# Patient Record
Sex: FEMALE | Race: WHITE | Employment: FULL TIME | URBAN - METROPOLITAN AREA
[De-identification: names, ages, dates, MRNs, and addresses within clinical notes are randomized per-mention and may not be internally consistent; named-entity substitution may affect disease eponyms.]

---

## 2017-05-25 ENCOUNTER — OFFICE VISIT (OUTPATIENT)
Dept: FAMILY MEDICINE CLINIC | Age: 57
End: 2017-05-25

## 2017-05-25 VITALS
BODY MASS INDEX: 33.82 KG/M2 | TEMPERATURE: 98.1 F | HEART RATE: 82 BPM | SYSTOLIC BLOOD PRESSURE: 134 MMHG | OXYGEN SATURATION: 98 % | WEIGHT: 203 LBS | RESPIRATION RATE: 18 BRPM | HEIGHT: 65 IN | DIASTOLIC BLOOD PRESSURE: 76 MMHG

## 2017-05-25 DIAGNOSIS — Z11.59 NEED FOR HEPATITIS C SCREENING TEST: ICD-10-CM

## 2017-05-25 DIAGNOSIS — E10.42 DIABETIC PERIPHERAL NEUROPATHY ASSOCIATED WITH TYPE 1 DIABETES MELLITUS (HCC): ICD-10-CM

## 2017-05-25 DIAGNOSIS — J45.30 MILD PERSISTENT ASTHMA WITHOUT COMPLICATION: ICD-10-CM

## 2017-05-25 DIAGNOSIS — E66.9 OBESITY (BMI 30-39.9): ICD-10-CM

## 2017-05-25 DIAGNOSIS — L84 PRE-ULCERATIVE CALLUSES: ICD-10-CM

## 2017-05-25 DIAGNOSIS — F33.41 RECURRENT MAJOR DEPRESSIVE DISORDER, IN PARTIAL REMISSION (HCC): ICD-10-CM

## 2017-05-25 DIAGNOSIS — Z00.00 WELCOME TO MEDICARE PREVENTIVE VISIT: Primary | ICD-10-CM

## 2017-05-25 DIAGNOSIS — I10 ESSENTIAL HYPERTENSION: ICD-10-CM

## 2017-05-25 DIAGNOSIS — E10.40 TYPE 1 DIABETES MELLITUS WITH DIABETIC NEUROPATHY (HCC): ICD-10-CM

## 2017-05-25 DIAGNOSIS — M72.2 PLANTAR FASCIITIS, RIGHT: ICD-10-CM

## 2017-05-25 PROBLEM — E11.9 TYPE 2 DIABETES MELLITUS (HCC): Status: ACTIVE | Noted: 2017-05-25

## 2017-05-25 PROBLEM — J45.909 ASTHMA: Status: ACTIVE | Noted: 2017-05-25

## 2017-05-25 PROBLEM — J45.909 ASTHMA: Status: RESOLVED | Noted: 2017-05-25 | Resolved: 2017-05-25

## 2017-05-25 PROBLEM — E11.9 TYPE 2 DIABETES MELLITUS (HCC): Status: RESOLVED | Noted: 2017-05-25 | Resolved: 2017-05-25

## 2017-05-25 RX ORDER — LOSARTAN POTASSIUM 50 MG/1
50 TABLET ORAL DAILY
Qty: 90 TAB | Refills: 3 | Status: SHIPPED | OUTPATIENT
Start: 2017-05-25 | End: 2017-06-02 | Stop reason: SDUPTHER

## 2017-05-25 RX ORDER — BUPROPION HYDROCHLORIDE 150 MG/1
150 TABLET ORAL
Qty: 90 TAB | Refills: 3 | Status: SHIPPED | OUTPATIENT
Start: 2017-05-25 | End: 2018-09-04 | Stop reason: SDUPTHER

## 2017-05-25 RX ORDER — ALBUTEROL SULFATE 90 UG/1
2 AEROSOL, METERED RESPIRATORY (INHALATION)
Qty: 1 INHALER | Refills: 1 | Status: SHIPPED | OUTPATIENT
Start: 2017-05-25 | End: 2017-11-07 | Stop reason: SDUPTHER

## 2017-05-25 RX ORDER — PRAVASTATIN SODIUM 40 MG/1
40 TABLET ORAL
COMMUNITY
End: 2017-05-25 | Stop reason: SDUPTHER

## 2017-05-25 RX ORDER — DULOXETIN HYDROCHLORIDE 60 MG/1
60 CAPSULE, DELAYED RELEASE ORAL DAILY
COMMUNITY
End: 2017-05-25 | Stop reason: SDUPTHER

## 2017-05-25 RX ORDER — LANOLIN ALCOHOL/MO/W.PET/CERES
1000 CREAM (GRAM) TOPICAL DAILY
COMMUNITY

## 2017-05-25 RX ORDER — BUPROPION HYDROCHLORIDE 150 MG/1
TABLET, EXTENDED RELEASE ORAL DAILY
COMMUNITY
End: 2017-05-25

## 2017-05-25 RX ORDER — PRAVASTATIN SODIUM 40 MG/1
40 TABLET ORAL
Qty: 90 TAB | Refills: 3 | Status: SHIPPED | OUTPATIENT
Start: 2017-05-25 | End: 2017-05-27

## 2017-05-25 RX ORDER — CYANOCOBALAMIN (VITAMIN B-12) 500 MCG
400 TABLET ORAL DAILY
COMMUNITY

## 2017-05-25 RX ORDER — DULOXETIN HYDROCHLORIDE 60 MG/1
60 CAPSULE, DELAYED RELEASE ORAL DAILY
Qty: 90 CAP | Refills: 3 | Status: SHIPPED | OUTPATIENT
Start: 2017-05-25 | End: 2018-07-29 | Stop reason: SDUPTHER

## 2017-05-25 NOTE — MR AVS SNAPSHOT
Visit Information Date & Time Provider Department Dept. Phone Encounter #  
 5/25/2017  2:00 PM Katelyn Garcia, [address] [facility] [phone] [id number] Follow-up Instructions Return in about 6 months (around 11/25/2017). Upcoming Health Maintenance Date Due Hepatitis C Screening 1960 HEMOGLOBIN A1C Q6M 1960 LIPID PANEL Q1 1960 FOOT EXAM Q1 10/28/1970 MICROALBUMIN Q1 10/28/1970 EYE EXAM RETINAL OR DILATED Q1 10/28/1970 Pneumococcal 19-64 Medium Risk (1 of 1 - PPSV23) 10/28/1979 DTaP/Tdap/Td series (1 - Tdap) 10/28/1981 PAP AKA CERVICAL CYTOLOGY 10/28/1981 BREAST CANCER SCRN MAMMOGRAM 10/28/2010 FOBT Q 1 YEAR AGE 50-75 10/28/2010 INFLUENZA AGE 9 TO ADULT 8/1/2017 Allergies as of 5/25/2017  Review Complete On: 5/25/2017 By: Katelyn Garcia MD  
  
 Severity Noted Reaction Type Reactions Lyrica [Pregabalin]  05/25/2017   Intolerance Vertigo Current Immunizations  Never Reviewed No immunizations on file. Not reviewed this visit You Were Diagnosed With   
  
 Codes Comments Welcome to Medicare preventive visit    -  Primary ICD-10-CM: Z00.00 ICD-9-CM: V70.0 Type 1 diabetes mellitus with diabetic neuropathy (HCC)     ICD-10-CM: E10.40 ICD-9-CM: 250.61, 357.2 Essential hypertension     ICD-10-CM: I10 
ICD-9-CM: 401.9 Obesity (BMI 30-39. 9)     ICD-10-CM: E66.9 ICD-9-CM: 278.00 Mild persistent asthma without complication     [facility]: J45.30 ICD-9-CM: 493.90 Plantar fasciitis, right     ICD-10-CM: M72.2 ICD-9-CM: 728.71 Need for hepatitis C screening test     ICD-10-CM: Z11.59 
ICD-9-CM: V73.89 Pre-ulcerative calluses     ICD-10-CM: L84 
ICD-9-CM: 595 Diabetic peripheral neuropathy associated with type 1 diabetes mellitus (HCC)     ICD-10-CM: E10.42 
ICD-9-CM: 250.61, 357.2 Vitals BP Pulse Temp Resp Height(growth percentile) Weight(growth percentile) 134/76 (BP 1 Location: Left arm, BP Patient Position: Sitting) 82 98.1 °F (36.7 °C) (Oral) 18 5' 5\" (1.651 m) 203 lb (92.1 kg) SpO2 BMI OB Status Smoking Status 98% 33.78 kg/m2 Postmenopausal Never Smoker Vitals History BMI and BSA Data Body Mass Index Body Surface Area 33.78 kg/m 2 2.06 m 2 Preferred Pharmacy Pharmacy Name [facility] [address] [place], [place] - [address] [place] [address] [phone] Your Updated Medication List  
  
   
This list is accurate as of: 5/25/17  3:28 PM.  Always use your most recent med list.  
  
  
  
  
 albuterol 90 mcg/actuation inhaler Commonly known as:  PROVENTIL HFA, VENTOLIN HFA, PROAIR HFA Take 2 Puffs by inhalation every four (4) hours as needed for Wheezing. APIDRA 100 unit/mL injection Generic drug:  insulin glulisine  
by SubCUTAneous route. Uses 40 units daily with insulin pump buPROPion  mg tablet Commonly known as:  Malinda Ty Take 1 Tab by mouth every morning. cholecalciferol 400 unit Tab tablet Commonly known as:  VITAMIN D3 Take  by mouth daily. CITRACAL + D PO Take  by mouth. DULoxetine 60 mg capsule Commonly known as:  CYMBALTA Take 1 Cap by mouth daily. losartan 50 mg tablet Commonly known as:  COZAAR Take 1 Tab by mouth daily. mometasone-formoterol 200-5 mcg/actuation HFA inhaler Commonly known as:  Albertine Shines Take 2 Puffs by inhalation two (2) times a day. OTHER Bilateral diabetic orthotic shoes for callouses L84 and neuropathy with type 1 DM E10.42 RONNIE 99mo prog fair  
  
 pravastatin 40 mg tablet Commonly known as:  PRAVACHOL Take 1 Tab by mouth nightly. VITAMIN B-12 1,000 mcg tablet Generic drug:  cyanocobalamin Take 1,000 mcg by mouth daily. Prescriptions Printed Refills  OTHER 1  
 Sig: Bilateral diabetic orthotic shoes for callouses L84 and neuropathy with type 1 DM E10.42 RONNIE 99mo prog fair Class: Print Prescriptions Sent to Pharmacy Refills  
 losartan (COZAAR) 50 mg tablet 3 Sig: Take 1 Tab by mouth daily. Class: Normal  
 Pharmacy: [address] Ph #: [phone] Route: Oral  
 buPROPion XL (WELLBUTRIN XL) 150 mg tablet 3 Sig: Take 1 Tab by mouth every morning. Class: Normal  
 Pharmacy: [address] Ph #: [phone] Route: Oral  
 mometasone-formoterol (DULERA) 200-5 mcg/actuation HFA inhaler 3 Sig: Take 2 Puffs by inhalation two (2) times a day. Class: Normal  
 Pharmacy: [address] Ph #: [phone] Route: Inhalation  
 albuterol (PROVENTIL HFA, VENTOLIN HFA, PROAIR HFA) 90 mcg/actuation inhaler 1 Sig: Take 2 Puffs by inhalation every four (4) hours as needed for Wheezing. Class: Normal  
 Pharmacy: [address] Ph #: [phone] Route: Inhalation  
 pravastatin (PRAVACHOL) 40 mg tablet 3 Sig: Take 1 Tab by mouth nightly. Class: Normal  
 Pharmacy: [address] Ph #: [phone] Route: Oral  
 DULoxetine (CYMBALTA) 60 mg capsule 3 Sig: Take 1 Cap by mouth daily. Class: Normal  
 Pharmacy: [address] Ph #: [phone] Route: Oral  
  
We Performed the Following CBC WITH AUTOMATED DIFF [46805 CPT(R)] HEMOGLOBIN A1C WITH EAG [55850 CPT(R)] HEPATITIS C AB [79700 CPT(R)]  DIABETES FOOT EXAM [HM7 Custom] LIPID PANEL [38968 CPT(R)] METABOLIC PANEL, COMPREHENSIVE [85219 CPT(R)] MICROALB/CREAT RATIO, TIMED UR [id number] CPT(R)] REFERRAL TO ENDOCRINOLOGY [EGX38 Custom] Comments: Please evaluate patient for follow up diabetes on pump. REFERRAL TO OPHTHALMOLOGY [REF57 Custom] Comments:  
 Please evaluate patient for diabetic follow up. TSH 3RD GENERATION [25426 CPT(R)] Follow-up Instructions Return in about 6 months (around 11/25/2017). Referral Information Referral ID Referred By Referred To  
  
 [id number] Shad Moody MD   
   [address] Phone: [phone] Fax: [phone] Visits Status Start Date End Date 1 New Request 5/25/17 5/25/18 If your referral has a status of pending review or denied, additional information will be sent to support the outcome of this decision. Referral ID Referred By Referred To  
 [id number] [facility], [address] [employer] [address], [address] [place] [address] Visits Status Start Date End Date 1 New Request 5/25/17 5/25/18 If your referral has a status of pending review or denied, additional information will be sent to support the outcome of this decision. Patient Instructions To make a habit of exercise, please start with 5 minutes of walking per day every day You have plantar fasciitis Ice it 1-2x per day for 15 min with frozen water bottles Wear very comfy shoes, blue gel inserts may be helpful Stretch foot back 2x per day for 30 seconds If not starting to get better in a couple of months we could get you braces to wear at night or consider injection, so just call It might take 6-12 months to completely go away Doing strength training can also help a great deal! Watch how at: 
[facility] [employer] [facility]: 
 
[place] Address: Tunde Sandoval, [address] Phone: [phone] Ted Dixon. Pharmacy [address] [place], [place] ?  
[phone] ? · [email or web address] [address] [facility], [address] [facility] 
[phone] Same fax number [facility] Hamzah Hickman [address], [place], [address] [place] [address]  
[phone] [facility] (CPAP supplies) Jatin Marshall  [address]ray[address] Phone: [phone] Fax: [phone] Learning About Diabetes Food Guidelines Your Care Instructions Meal planning is important to manage diabetes. It helps keep your blood sugar at a target level (which you set with your doctor). You don't have to eat special foods. You can eat what your family eats, including sweets once in a while. But you do have to pay attention to how often you eat and how much you eat of certain foods. You may want to work with a dietitian or a certified diabetes educator (CDE) to help you plan meals and snacks. A dietitian or CDE can also help you lose weight if that is one of your goals. What should you know about eating carbs? Managing the amount of carbohydrate (carbs) you eat is an important part of healthy meals when you have diabetes. Carbohydrate is found in many foods. · Learn which foods have carbs. And learn the amounts of carbs in different foods. ¨ Bread, cereal, pasta, and rice have about 15 grams of carbs in a serving. A serving is 1 slice of bread (1 ounce), ½ cup of cooked cereal, or 1/3 cup of cooked pasta or rice. ¨ Fruits have 15 grams of carbs in a serving. A serving is 1 small fresh fruit, such as an apple or orange; ½ of a banana; ½ cup of cooked or canned fruit; ½ cup of fruit juice; 1 cup of melon or raspberries; or 2 tablespoons of dried fruit. ¨ Milk and no-sugar-added yogurt have 15 grams of carbs in a serving. A serving is 1 cup of milk or 2/3 cup of no-sugar-added yogurt. ¨ Starchy vegetables have 15 grams of carbs in a serving. A serving is ½ cup of mashed potatoes or sweet potato; 1 cup winter squash; ½ of a small baked potato; ½ cup of cooked beans; or ½ cup cooked corn or green peas. · Learn how much carbs to eat each day and at each meal. A dietitian or CDE can teach you how to keep track of the amount of carbs you eat. This is called carbohydrate counting. · If you are not sure how to count carbohydrate grams, use the Plate Method to plan meals. It is a good, quick way to make sure that you have a balanced meal. It also helps you spread carbs throughout the day. ¨ Divide your plate by types of foods. Put non-starchy vegetables on half the plate, meat or other protein food on one-quarter of the plate, and a grain or starchy vegetable in the final quarter of the plate. To this you can add a small piece of fruit and 1 cup of milk or yogurt, depending on how many carbs you are supposed to eat at a meal. 
· Try to eat about the same amount of carbs at each meal. Do not \"save up\" your daily allowance of carbs to eat at one meal. 
· Proteins have very little or no carbs per serving. Examples of proteins are beef, chicken, turkey, fish, eggs, tofu, cheese, cottage cheese, and peanut butter. A serving size of meat is 3 ounces, which is about the size of a deck of cards. Examples of meat substitute serving sizes (equal to 1 ounce of meat) are 1/4 cup of cottage cheese, 1 egg, 1 tablespoon of peanut butter, and ½ cup of tofu. How can you eat out and still eat healthy? · Learn to estimate the serving sizes of foods that have carbohydrate. If you measure food at home, it will be easier to estimate the amount in a serving of restaurant food. · If the meal you order has too much carbohydrate (such as potatoes, corn, or baked beans), ask to have a low-carbohydrate food instead. Ask for a salad or green vegetables. · If you use insulin, check your blood sugar before and after eating out to help you plan how much to eat in the future. · If you eat more carbohydrate at a meal than you had planned, take a walk or do other exercise. This will help lower your blood sugar. What else should you know? · Limit saturated fat, such as the fat from meat and dairy products. This is a healthy choice because people who have diabetes are at higher risk of heart disease. So choose lean cuts of meat and nonfat or low-fat dairy products. Use olive or canola oil instead of butter or shortening when cooking. · Don't skip meals. Your blood sugar may drop too low if you skip meals and take insulin or certain medicines for diabetes. · Check with your doctor before you drink alcohol. Alcohol can cause your blood sugar to drop too low. Alcohol can also cause a bad reaction if you take certain diabetes medicines. Follow-up care is a key part of your treatment and safety. Be sure to make and go to all appointments, and call your doctor if you are having problems. It's also a good idea to know your test results and keep a list of the medicines you take. Where can you learn more? Go to http://radha-jose antonio.info/. Enter M940 in the search box to learn more about \"Learning About Diabetes Food Guidelines. \" Current as of: May 23, 2016 Content Version: 11.2 © 5552-6381 [employer]. Care instructions adapted under license by [employer] (which disclaims liability or warranty for this information). If you have questions about a medical condition or this instruction, always ask your healthcare professional. [address] any warranty or liability for your use of this information. If you would like help making an advanced directive (living will), please call our office general number ([phone]) and ask for Rosi Arriaza RN (nurse navigator). Check your losartan to make this is the right dose before you take i. (Checklist to be included in patient instructions) Discussed today Done Previously Not Needed X rx to pharm   Pneumococcal vaccines X fall   Flu vaccine  
  x Hepatitis B vaccine (if at risk) x Shingles vaccine X rx to pharm   TDAP vaccine X per gyn  Mammogram  
 X 2/16  Pap smear  
 x  Colorectal cancer screening  
  x Low-dose CT for lung cancer screening  
  x Bone density test  
 x  Glaucoma screening  
 x  Cholesterol test  
 x  Diabetes screening test   
  x AAA ultrasound (if family history) x  Diabetes self-management class  
 x  Nutritionist referral for diabetes or renal disease Introducing [facility]! [employer] introduces [employer] patient portal. Now you can access parts of your medical record, email your doctor's office, and request medication refills online. 1. In your internet browser, go to [email or web address] 2. Click on the First Time User? Click Here link in the Sign In box. You will see the New Member Sign Up page. 3. Enter your [employer] Access Code exactly as it appears below. You will not need to use this code after youve completed the sign-up process. If you do not sign up before the expiration date, you must request a new code. · [employer] Access Code: [email or web address] Expires: 8/23/2017  2:45 PM 
 
4. Enter the last four digits of your Social Security Number (xxxx) and Date of Birth (mm/dd/yyyy) as indicated and click Submit. You will be taken to the next sign-up page. 5. Create a [employer] ID. This will be your [employer] login ID and cannot be changed, so think of one that is secure and easy to remember. 6. Create a [employer] password. You can change your password at any time. 7. Enter your Password Reset Question and Answer. This can be used at a later time if you forget your password. 8. Enter your e-mail address. You will receive e-mail notification when new information is available in [address] 9. Click Sign Up. You can now view and download portions of your medical record. 10. Click the Download Summary menu link to download a portable copy of your medical information.  
 
If you have questions, please visit the Frequently Asked Questions section of the [employer]. Remember, [employer] is NOT to be used for urgent needs. For medical emergencies, dial 911. Now available from your iPhone and Android! Please provide this summary of care documentation to your next provider. Your primary care clinician is listed as Aliza Mccarthy. If you have any questions after today's visit, please call [phone].

## 2017-05-25 NOTE — PATIENT INSTRUCTIONS
To make a habit of exercise, please start with 5 minutes of walking per day every day    You have plantar fasciitis  Ice it 1-2x per day for 15 min with frozen water bottles  Wear very comfy shoes, blue gel inserts may be helpful  Stretch foot back 2x per day for 30 seconds  If not starting to get better in a couple of months we could get you braces to wear at night or consider injection, so just call  It might take 6-12 months to completely go away  Doing strength training can also help a great deal! Watch how at:  [employer]    Medical Supply Stores:    [facility]  Address: [facility], [address] [place] [address]   Phone: [phone]    [address] [place] [address] Street. Pharmacy  [address] [place], [place] ?   [phone] ? · [email or web address] [place]     [address], [address] [facility]  [phone]  Same fax number    [facility], [address] [place] [address]   [phone]    [address] [facility] (CPAP supplies)  [address]  Phone: [phone]  Fax: [phone]             Learning About Diabetes Food Guidelines  Your Care Instructions  Meal planning is important to manage diabetes. It helps keep your blood sugar at a target level (which you set with your doctor). You don't have to eat special foods. You can eat what your family eats, including sweets once in a while. But you do have to pay attention to how often you eat and how much you eat of certain foods. You may want to work with a dietitian or a certified diabetes educator (CDE) to help you plan meals and snacks. A dietitian or CDE can also help you lose weight if that is one of your goals. What should you know about eating carbs? Managing the amount of carbohydrate (carbs) you eat is an important part of healthy meals when you have diabetes. Carbohydrate is found in many foods. · Learn which foods have carbs.  And learn the amounts of carbs in different foods. ¨ Bread, cereal, pasta, and rice have about 15 grams of carbs in a serving. A serving is 1 slice of bread (1 ounce), ½ cup of cooked cereal, or 1/3 cup of cooked pasta or rice. ¨ Fruits have 15 grams of carbs in a serving. A serving is 1 small fresh fruit, such as an apple or orange; ½ of a banana; ½ cup of cooked or canned fruit; ½ cup of fruit juice; 1 cup of melon or raspberries; or 2 tablespoons of dried fruit. ¨ Milk and no-sugar-added yogurt have 15 grams of carbs in a serving. A serving is 1 cup of milk or 2/3 cup of no-sugar-added yogurt. ¨ Starchy vegetables have 15 grams of carbs in a serving. A serving is ½ cup of mashed potatoes or sweet potato; 1 cup winter squash; ½ of a small baked potato; ½ cup of cooked beans; or ½ cup cooked corn or green peas. · Learn how much carbs to eat each day and at each meal. A dietitian or CDE can teach you how to keep track of the amount of carbs you eat. This is called carbohydrate counting. · If you are not sure how to count carbohydrate grams, use the Plate Method to plan meals. It is a good, quick way to make sure that you have a balanced meal. It also helps you spread carbs throughout the day. ¨ Divide your plate by types of foods. Put non-starchy vegetables on half the plate, meat or other protein food on one-quarter of the plate, and a grain or starchy vegetable in the final quarter of the plate. To this you can add a small piece of fruit and 1 cup of milk or yogurt, depending on how many carbs you are supposed to eat at a meal.  · Try to eat about the same amount of carbs at each meal. Do not \"save up\" your daily allowance of carbs to eat at one meal.  · Proteins have very little or no carbs per serving. Examples of proteins are beef, chicken, turkey, fish, eggs, tofu, cheese, cottage cheese, and peanut butter. A serving size of meat is 3 ounces, which is about the size of a deck of cards.  Examples of meat substitute serving sizes (equal to 1 ounce of meat) are 1/4 cup of cottage cheese, 1 egg, 1 tablespoon of peanut butter, and ½ cup of tofu. How can you eat out and still eat healthy? · Learn to estimate the serving sizes of foods that have carbohydrate. If you measure food at home, it will be easier to estimate the amount in a serving of restaurant food. · If the meal you order has too much carbohydrate (such as potatoes, corn, or baked beans), ask to have a low-carbohydrate food instead. Ask for a salad or green vegetables. · If you use insulin, check your blood sugar before and after eating out to help you plan how much to eat in the future. · If you eat more carbohydrate at a meal than you had planned, take a walk or do other exercise. This will help lower your blood sugar. What else should you know? · Limit saturated fat, such as the fat from meat and dairy products. This is a healthy choice because people who have diabetes are at higher risk of heart disease. So choose lean cuts of meat and nonfat or low-fat dairy products. Use olive or canola oil instead of butter or shortening when cooking. · Don't skip meals. Your blood sugar may drop too low if you skip meals and take insulin or certain medicines for diabetes. · Check with your doctor before you drink alcohol. Alcohol can cause your blood sugar to drop too low. Alcohol can also cause a bad reaction if you take certain diabetes medicines. Follow-up care is a key part of your treatment and safety. Be sure to make and go to all appointments, and call your doctor if you are having problems. It's also a good idea to know your test results and keep a list of the medicines you take. Where can you learn more? Go to http://radha-jose antonio.info/. Enter H806 in the search box to learn more about \"Learning About Diabetes Food Guidelines. \"  Current as of: May 23, 2016  Content Version: 11.2  © 9639-1471 [employer].  Care instructions adapted under license by [employer] (which disclaims liability or warranty for this information). If you have questions about a medical condition or this instruction, always ask your healthcare professional. [address] any warranty or liability for your use of this information. If you would like help making an advanced directive (living will), please call our office general number ([phone]) and ask for Sheila Carranza RN (nurse navigator). Check your losartan to make this is the right dose before you take i.     (Checklist to be included in patient instructions)  Discussed today Done Previously Not Needed    X rx to pharm   Pneumococcal vaccines   X fall   Flu vaccine     x Hepatitis B vaccine (if at risk)     x Shingles vaccine   X rx to pharm   TDAP vaccine    X per gyn  Mammogram    X 2/16  Pap smear    x  Colorectal cancer screening     x Low-dose CT for lung cancer screening     x Bone density test    x  Glaucoma screening    x  Cholesterol test    x  Diabetes screening test      x AAA ultrasound (if family history)    x  Diabetes self-management class    x  Nutritionist referral for diabetes or renal disease

## 2017-05-25 NOTE — PROGRESS NOTES
Alexx Edmondson [address] [place] [address] [address] [place], [address]  [phone]             Date of visit: 5/25/2017     This is an Initial Medicare Preventive Physical Exam (IPPE), \"Welcome to Medicare\" (Performed within 12 months of effective date of Medicare Part B enrollment, Once in a lifetime, not to be done if patient already had a Medicare Annual Wellness Visit)    I have reviewed the patient's medical history in detail and updated the computerized patient record. Jesus Aceves is a 64 y.o. female   History obtained from: the patient. Concerns today   (Patient understands that medical problems addressed today may incur additional cost as this is a preventive visit)  -last a1c 8.2, was in December. Sees an endocrinologist but needs a referal back to Dr. Sharifa Stratton. Went on [facility] in April. Has not had labs since Dec  Has type 1 DM for 45 years  -says she is \"lazy\" but does try with the diet, has had lots of education over the years  -has a pump, really likes it, has been a life-changer for her  -has been out of her wellbutrin for 1 month and wants to go back on it,m elps her mood  -has been on cymbalta for about 10 years, really helps her neuropathy and other arthritis pains too. Wants to stay on it  -doesn't exercise regularly. Used to walk a lot but now heel hurts so walks a little. Not very motivated  -mild asthma does well on controller inhaler, rarely needs recue inhaler, main triggers are weather changes   -chronic right foot/heel pain, has tried different shoes, likes her sandals      History     Patient Active Problem List   Diagnosis Code    Essential hypertension I10    Obesity (BMI 30-39. 9) E66.9    Type 1 diabetes mellitus with diabetic neuropathy (HCC) E10.40    Mild persistent asthma without complication G37.65    Pre-ulcerative calluses L84    Plantar fasciitis, right M72.2    Diabetic peripheral neuropathy associated with type 1 diabetes mellitus ([facility] E10.42    Recurrent major depressive disorder, in partial remission ([facility] F33.41     Past Medical History:   Diagnosis Date    Asthma     Diabetes ([facility]     Hypertension       Past Surgical History:   Procedure Laterality Date    HX CARPAL TUNNEL RELEASE      bilat    HX ORTHOPAEDIC       Allergies   Allergen Reactions    Lyrica [Pregabalin] Vertigo     Current Outpatient Prescriptions   Medication Sig Dispense Refill    insulin glulisine (APIDRA) 100 unit/mL injection by SubCUTAneous route. Uses 40 units daily with insulin pump      cholecalciferol (VITAMIN D3) 400 unit tab tablet Take  by mouth daily.  cyanocobalamin (VITAMIN B-12) 1,000 mcg tablet Take 1,000 mcg by mouth daily.  CALCIUM CITRATE/VITAMIN D3 (CITRACAL + D PO) Take  by mouth.  losartan (COZAAR) 50 mg tablet Take 1 Tab by mouth daily. 90 Tab 3    buPROPion XL (WELLBUTRIN XL) 150 mg tablet Take 1 Tab by mouth every morning. 90 Tab 3    mometasone-formoterol (DULERA) 200-5 mcg/actuation HFA inhaler Take 2 Puffs by inhalation two (2) times a day. 3 Inhaler 3    albuterol (PROVENTIL HFA, VENTOLIN HFA, PROAIR HFA) 90 mcg/actuation inhaler Take 2 Puffs by inhalation every four (4) hours as needed for Wheezing. 1 Inhaler 1    pravastatin (PRAVACHOL) 40 mg tablet Take 1 Tab by mouth nightly. 90 Tab 3    DULoxetine (CYMBALTA) 60 mg capsule Take 1 Cap by mouth daily.  80 Cap 3    OTHER Bilateral diabetic orthotic shoes for callouses L84 and neuropathy with type 1 DM E10.42 RONNIE 99mo prog fair 2 Each 1     Family History   Problem Relation Age of Onset    Diabetes Mother     Heart Disease Mother     Heart Disease Father     Cancer Sister      braest    No Known Problems Sister     No Known Problems Sister     No Known Problems Sister     No Known Problems Sister     Cancer Sister      stomach ca     Social History   Substance Use Topics    Smoking status: Never Smoker    Smokeless tobacco: Not on file    Alcohol use No Specialists/Care Team   Bruce Sales has established care with the following healthcare providers:  Patient Care Team:  Aristides Cowan MD as PCP - General (Family Practice)  Nam Freedman MD (Endocrinology)  Adriana Underwood MD (Ophthalmology)  Pam Brown MD (Obstetrics & Gynecology)    Depression Risk Factor Screening:   -Over the past 2 weeks, have you felt down, depressed or hopeless? A little, off wellbutrin for 1 mo  -Over the past 2 weeks, have you felt little interest or pleasure in doing things? sometimes    Lifestyle and Health Habits:   Diet: tries to follow the diet for DM, has had a lot training an help in counting carbs  Physical activity: not much recently    Functional Ability and Level of Safety:     Hearing Loss   Have you noticed any hearing difficulties? no    Activities of Daily Living   Do you need help with the phone, transportation, shopping, preparing meals, housework, laundry, medications or managing money? no  Requires assistance with: no ADLs    Fall Risk and Home Safety   Was the patient's timed Up & Go test unsteady or longer than 30 seconds? no  Does your home have rugs in the hallway, lack grab bars in the bathroom, lack handrails on the stairs or have poor lighting? No, has one-story house, no rugs  Do you have smoke detectors and check them regularly?  No but will be putting batteries back in them    Abuse Screen   Patient is not abused    Evaluation of Cognitive Function   Mood/affect:  neutral  Orientation: normal  Appearance: age appropriate  Family member/caregiver input: none    Review of Systems (if indicated for problems addressed today)   Card: denies chest pain  Pulm: denies shortness of breath  GI: denies hematochezia  Psych: denies SI  MSK: admits to chronic pain right foot  Ophthal: admits to mild vision loss but usually doesn't even use her glasses  : denies dysuria  All: admits to some allergies in spring affects asthma  ENT denies congestion  Neuro: denies dizziness now but had when she was o  n lyrica      Physical Examination     Vitals:    05/25/17 1436   BP: 134/76   Pulse: 82   Resp: 18   Temp: 98.1 °F (36.7 °C)   TempSrc: Oral   SpO2: 98%   Weight: 203 lb (92.1 kg)   Height: 5' 5\" (1.651 m)     Body mass index is 33.78 kg/(m^2). No exam data present  Additional exam if indicated for problems addressed today:  General: stated age, well developed, well nourished and in NAD  Neck: supple, symmetrical, trachea midline, no adenopathy and thyroid: not enlarged, symmetric, no tenderness/mass/nodules  Lungs:  clear to auscultation w/o rales, rhonchi, wheezes w/normal effort and no use of accessory muscles of respiration   Heart: regular rate and rhythm, S1, S2 normal, no murmur, click, rub or gallop  Abdomen: soft, nontender, no masses, BS normal  Ext:  No edema noted.  Decreased sensation to monofiliament in toes, has callouses bilaterally in various places, some with cracks, is tender right heel at insertion plantar fascia  Lymph: no cervical adenopathy appreciated  Skin:  Normal. and no rash or abnormalities   Psych: alert and oriented to person, place, time and situation and Speech: appropriate quality, quantity and organization of sentences    Screening EKG not done, will get records    Advice/Referrals/Counseling (as indicated)   Education and counseling provided for any problems identified above: diet, already knows what to do  Counseled about tobacco cessation: n/a  Counseled about alcohol misuse: n/a  Counseled about prevention of sexually transmitted infections: none    Preventive Services     (Checklist to be included in patient instructions)  Discussed today Done Previously Not Needed    X rx to pharm   Pneumococcal vaccines   X fall   Flu vaccine     x Hepatitis B vaccine (if at risk)     x Shingles vaccine   X rx to pharm   TDAP vaccine    X per gyn  Mammogram    X 2/16  Pap smear    x  Colorectal cancer screening     x Low-dose CT for lung cancer screening     x Bone density test    x  Glaucoma screening    x  Cholesterol test    x  Diabetes screening test      x AAA ultrasound (if family history)    x  Diabetes self-management class    x  Nutritionist referral for diabetes or renal disease     Discussion of Advance Directive   Discussed with [facility] [place] [address] Hi Estevez her ability to prepare and advance directive in the case that an injury or illness causes her to be unable to make health care decisions. Discussed that I would be willing to follow her wishes as expressed in the advance directive. No, but would like to get one. Assessment/Plan   Z00.00    ICD-10-CM ICD-9-CM    1. Welcome to Medicare preventive visit Z00.00 V70.0    2. Type 1 diabetes mellitus with diabetic neuropathy (HCC) E10.40 250.61 REFERRAL TO ENDOCRINOLOGY     357.2 REFERRAL TO OPHTHALMOLOGY      HEMOGLOBIN A1C WITH EAG      LIPID PANEL      METABOLIC PANEL, COMPREHENSIVE      CBC WITH AUTOMATED DIFF      TSH 3RD GENERATION       DIABETES FOOT EXAM      MICROALBUMIN, UR, RAND W/ MICROALBUMIN/CREA RATIO      CANCELED: MICROALB/CREAT RATIO, TIMED UR    does well with pump but needs referral back to endo, eye doc   3. Essential hypertension I10 401.9     doing well, no med change   4. Obesity (BMI 30-39. 9) E66.9 278.00     encouraged more regular exercise   5. Mild persistent asthma without complication P17.56 354.42     does well on her controller inhaler, continue   6. Plantar fasciitis, right M72.2 728.71     advised gel inserts, stretches, ice, diabetic shoes   7. Need for hepatitis C screening test Z11.59 V73.89 HEPATITIS C AB   8. Pre-ulcerative calluses L84 700    9. Diabetic peripheral neuropathy associated with type 1 diabetes mellitus (HCC) E10.42 250.61      357. 2     cymbalta helps enough, lyrica helped more but made her dizzy   10.  Recurrent major depressive disorder, in partial remission [facility] F33.41 296.35     cymbalta does pretty well but will add back wellbutrin, too; she did better when on both       Orders Placed This Encounter    HEMOGLOBIN A1C WITH EAG    LIPID PANEL    METABOLIC PANEL, COMPREHENSIVE    CBC WITH AUTOMATED DIFF    TSH 3RD GENERATION    HEPATITIS C AB    MICROALBUMIN, UR, RAND W/ MICROALBUMIN/CREA RATIO    REFERRAL TO ENDOCRINOLOGY    REFERRAL TO OPHTHALMOLOGY     DIABETES FOOT EXAM    insulin glulisine (APIDRA) 100 unit/mL injection    DISCONTD: DULoxetine (CYMBALTA) 60 mg capsule    DISCONTD: pravastatin (PRAVACHOL) 40 mg tablet    DISCONTD: buPROPion ER (BUPROBAN) 150 mg ER tablet    cholecalciferol (VITAMIN D3) 400 unit tab tablet    cyanocobalamin (VITAMIN B-12) 1,000 mcg tablet    CALCIUM CITRATE/VITAMIN D3 (CITRACAL + D PO)    DISCONTD: mometasone-formoterol (DULERA) 200-5 mcg/actuation HFA inhaler    losartan (COZAAR) 50 mg tablet    buPROPion XL (WELLBUTRIN XL) 150 mg tablet    mometasone-formoterol (DULERA) 200-5 mcg/actuation HFA inhaler    albuterol (PROVENTIL HFA, VENTOLIN HFA, PROAIR HFA) 90 mcg/actuation inhaler    pravastatin (PRAVACHOL) 40 mg tablet    DULoxetine (CYMBALTA) 60 mg capsule    DISCONTD: diph,Pertuss,Acell,,Tet Vac-PF (ADACEL) 2 Lf-(2.5-5-3-5 mcg)-5Lf/0.5 mL susp    DISCONTD: pneumococcal 23-valent (PNEUMOVAX 23) 25 mcg/0.5 mL injection    OTHER       Follow-up Disposition:  Return in about 6 months (around 11/25/2017).     Katelyn Garcia MD

## 2017-05-25 NOTE — PROGRESS NOTES
Chief Complaint   Patient presents with    New Patient     to [facility] provider. Dr. Preston Lucsa no longer takes her insurance    Diabetes     needs referral to see her endo. , also one for eye exam    Foot Pain     right heel pain x 1 1/2 months , ? spur        Reviewed Record in preparation for visit and have obtained necessary documentation. Identified pt with two pt identifiers (Name @ )    There are no preventive care reminders to display for this patient. 1. Have you been to the ER, urgent care clinic since your last visit? Hospitalized since your last visit? No    2. Have you seen or consulted any other health care providers outside of the [address] [place] [address] since your last visit? Include any pap smears or colon screening.  No

## 2017-05-26 LAB
ALBUMIN SERPL-MCNC: 4.2 G/DL (ref 3.5–5.5)
ALBUMIN/CREAT UR: 4.2 MG/G CREAT (ref 0–30)
ALBUMIN/GLOB SERPL: 1.6 {RATIO} (ref 1.2–2.2)
ALP SERPL-CCNC: 88 IU/L (ref 39–117)
ALT SERPL-CCNC: 11 IU/L (ref 0–32)
AST SERPL-CCNC: 13 IU/L (ref 0–40)
BASOPHILS # BLD AUTO: 0.1 X10E3/UL (ref 0–0.2)
BASOPHILS NFR BLD AUTO: 2 %
BILIRUB SERPL-MCNC: 0.2 MG/DL (ref 0–1.2)
BUN SERPL-MCNC: 9 MG/DL (ref 6–24)
BUN/CREAT SERPL: 11 (ref 9–23)
CALCIUM SERPL-MCNC: 9.2 MG/DL (ref 8.7–10.2)
CHLORIDE SERPL-SCNC: 102 MMOL/L (ref 96–106)
CHOLEST SERPL-MCNC: 214 MG/DL (ref 100–199)
CO2 SERPL-SCNC: 25 MMOL/L (ref 18–29)
CREAT SERPL-MCNC: 0.8 MG/DL (ref 0.57–1)
CREAT UR-MCNC: 92.9 MG/DL
EOSINOPHIL # BLD AUTO: 0.2 X10E3/UL (ref 0–0.4)
EOSINOPHIL NFR BLD AUTO: 5 %
ERYTHROCYTE [DISTWIDTH] IN BLOOD BY AUTOMATED COUNT: 14.2 % (ref 12.3–15.4)
EST. AVERAGE GLUCOSE BLD GHB EST-MCNC: 206 MG/DL
GLOBULIN SER CALC-MCNC: 2.7 G/DL (ref 1.5–4.5)
GLUCOSE SERPL-MCNC: 184 MG/DL (ref 65–99)
HBA1C MFR BLD: 8.8 % (ref 4.8–5.6)
HCT VFR BLD AUTO: 39.9 % (ref 34–46.6)
HCV AB S/CO SERPL IA: <0.1 S/CO RATIO (ref 0–0.9)
HDLC SERPL-MCNC: 72 MG/DL
HGB BLD-MCNC: 12.9 G/DL (ref 11.1–15.9)
IMM GRANULOCYTES # BLD: 0 X10E3/UL (ref 0–0.1)
IMM GRANULOCYTES NFR BLD: 0 %
INTERPRETATION, 910389: NORMAL
LDLC SERPL CALC-MCNC: 107 MG/DL (ref 0–99)
LYMPHOCYTES # BLD AUTO: 1 X10E3/UL (ref 0.7–3.1)
LYMPHOCYTES NFR BLD AUTO: 31 %
MCH RBC QN AUTO: 29.8 PG (ref 26.6–33)
MCHC RBC AUTO-ENTMCNC: 32.3 G/DL (ref 31.5–35.7)
MCV RBC AUTO: 92 FL (ref 79–97)
MICROALBUMIN UR-MCNC: 3.9 UG/ML
MONOCYTES # BLD AUTO: 0.3 X10E3/UL (ref 0.1–0.9)
MONOCYTES NFR BLD AUTO: 7 %
NEUTROPHILS # BLD AUTO: 1.8 X10E3/UL (ref 1.4–7)
NEUTROPHILS NFR BLD AUTO: 55 %
PLATELET # BLD AUTO: 357 X10E3/UL (ref 150–379)
POTASSIUM SERPL-SCNC: 4.3 MMOL/L (ref 3.5–5.2)
PROT SERPL-MCNC: 6.9 G/DL (ref 6–8.5)
RBC # BLD AUTO: 4.33 X10E6/UL (ref 3.77–5.28)
SODIUM SERPL-SCNC: 144 MMOL/L (ref 134–144)
TRIGL SERPL-MCNC: 176 MG/DL (ref 0–149)
TSH SERPL DL<=0.005 MIU/L-ACNC: 0.88 UIU/ML (ref 0.45–4.5)
VLDLC SERPL CALC-MCNC: 35 MG/DL (ref 5–40)
WBC # BLD AUTO: 3.4 X10E3/UL (ref 3.4–10.8)

## 2017-05-27 RX ORDER — ATORVASTATIN CALCIUM 40 MG/1
40 TABLET, FILM COATED ORAL
Qty: 90 TAB | Refills: 3 | Status: SHIPPED | OUTPATIENT
Start: 2017-05-27 | End: 2018-07-30 | Stop reason: SDUPTHER

## 2017-05-27 NOTE — PROGRESS NOTES
Sent in letter:    a1c is a little worse at 8.8, so it is important to keep working on making healthy diet changes and to follow up with the endocrinologist about your pump. I know you are trying the best you can. Doing the daily exercise may help some, as well (and would help your health in general). Cholesterol is not well-enough controlled on the pravastatin. I am going to send in lipitor (atorvastatin) for you to try instead. I don't think you will feel any differently on it. If you have problems with it, please let me know. Kidney (blood and urine) tests were great, as were liver, electrolytes, and blood counts.

## 2017-05-30 NOTE — TELEPHONE ENCOUNTER
Cleveland Pacheco     [phone]    Ms. Dinah Adams is returning 's call. Please call back when available.

## 2017-05-30 NOTE — TELEPHONE ENCOUNTER
Patient is calling, patient states that she would like to receive a call back from Dr. Brennen Menjivar nurse. Patient states at her last appointment she was prescribed Lipitor (40mg), but was taking Atorvastatin (25mg) before, patient states that she has been cutting her Lipitor medication in half and would like to know if this is correct or not.      Best call back # for patient: [phone]

## 2017-06-02 NOTE — TELEPHONE ENCOUNTER
Spoke to pt ans she states that she wasn't talking about lipitor. She was calling about the losartin. She was taking 25 mg not 50 mg. She has already picked up the 50- mg pills and she will break them in half.

## 2017-06-03 RX ORDER — LOSARTAN POTASSIUM 25 MG/1
25 TABLET ORAL DAILY
COMMUNITY
Start: 2017-06-03 | End: 2018-12-13 | Stop reason: SDUPTHER

## 2017-06-08 ENCOUNTER — TELEPHONE (OUTPATIENT)
Dept: FAMILY MEDICINE CLINIC | Age: 57
End: 2017-06-08

## 2017-06-08 NOTE — TELEPHONE ENCOUNTER
Referral Request Telephone Call      Insurance Name:     Nestor Rolle (MEDICARE REPLACEMENT/ADVANTAGE - HMO)    Insurance ID:  [id number]   Specialist Name: Dr. Ludin Yancey   Type of Specialty:  Endocrinologist    Address of Specialist:  [place] [address] [place], [place] [address]   Phone/Fax Number of Specialist: Phone:  [phone]  Fax: [phone]   Diagnosis: Diabetes   Appointment Date: 06-22-17   NPI    Tax ID                    Referral Request Telephone Call      Insurance Name:     Nestor Rolle ([facility])    Insurance ID:  [id number]   Specialist Name: Dr. Lucero Levi @ [facility]   Type of Specialty:  Opthamologist    Address of Specialist:  [address] [place] [address], [address] [place] [address], [address]   Phone/Fax Number of Specialist: Phone: [phone]  Fax: [phone]   Diagnosis: Diabetic annual eye exam   Appointment Date: 08-01-07   NPI    Tax ID

## 2017-06-08 NOTE — TELEPHONE ENCOUNTER
Unable to process referrals, patient has [address] listed as pcp at [facility]. Left message for patient to call to change pcp.

## 2017-06-19 ENCOUNTER — TELEPHONE (OUTPATIENT)
Dept: FAMILY MEDICINE CLINIC | Age: 57
End: 2017-06-19

## 2017-06-19 NOTE — TELEPHONE ENCOUNTER
Referral Request Telephone Call      Insurance Name:     Maninder DEL CID [facility] AND [facility] REPLACEMENT/ADVANTAGE - HMO) [[id number]] [address] [facility] , [address], [place] [address] [phone] View additional contact information       Insurance ID:  Ashley Bowligntone [facility]     Specialist Name: Dr. Ge Person   Type of Specialty:  endocrinologist    Address of Specialist:  [address] [place] [address] [place], [place] [address]   Phone/Fax Number of Specialist: RDXPA#[phone]  Fax#[phone]   Diagnosis: Diabetes management   Appointment Date: 6/22/2017   NPI    Tax ID

## 2017-06-29 ENCOUNTER — TELEPHONE (OUTPATIENT)
Dept: FAMILY MEDICINE CLINIC | Age: 57
End: 2017-06-29

## 2017-06-29 NOTE — TELEPHONE ENCOUNTER
Referral Request Telephone Call      Insurance Name:     Nj Comer (MEDICARE REPLACEMENT/ADVANTAGE - HMO    Insurance ID:   [id number]   Specialist Name: Dr. Starla Kussmaul   Type of Specialty:  opthamologist    Address of Specialist:  [address]    Phone/Fax Number of Specialist: Phone: [phone]  Fax: [phone]   Diagnosis: Diabetic eye exam   Appointment Date: 08/01/17   NPI    Tax ID

## 2017-07-24 ENCOUNTER — PATIENT MESSAGE (OUTPATIENT)
Dept: FAMILY MEDICINE CLINIC | Age: 57
End: 2017-07-24

## 2017-07-24 RX ORDER — MELOXICAM 15 MG/1
15 TABLET ORAL DAILY
Qty: 14 TAB | Refills: 0 | Status: SHIPPED | OUTPATIENT
Start: 2017-07-24 | End: 2017-08-07

## 2017-07-24 NOTE — TELEPHONE ENCOUNTER
Annemarie June LPN 3/80/3511 1:54 PM EDT        ----- Message -----   From: Anais Kim   Sent: 7/24/2017 3:42 PM   To: Clarita Tubbs  Subject: Visit Fara Mcmanus,    I need to ask for more help with my foot problem (Plantar fasciitis-right). My right foot is  and sore, and i think inside of ankle is swollen. I am still doing the foot exercises, and streching, and following your advice of  icing down my foot. The foot is just sore, and i really want to walk more. Please tell me what to try next, do i need to schedule an appointment with you? thanks for your time.     fredy arellano

## 2017-11-08 RX ORDER — FLUTICASONE FUROATE AND VILANTEROL 200; 25 UG/1; UG/1
1 POWDER RESPIRATORY (INHALATION) DAILY
Qty: 1 INHALER | Refills: 0 | Status: SHIPPED | OUTPATIENT
Start: 2017-11-08 | End: 2018-02-26 | Stop reason: SDUPTHER

## 2017-11-08 RX ORDER — ALBUTEROL SULFATE 90 UG/1
2 AEROSOL, METERED RESPIRATORY (INHALATION)
Qty: 1 INHALER | Refills: 0 | Status: SHIPPED | OUTPATIENT
Start: 2017-11-08 | End: 2018-02-26 | Stop reason: SDUPTHER

## 2017-11-28 ENCOUNTER — TELEPHONE (OUTPATIENT)
Dept: FAMILY MEDICINE CLINIC | Age: 57
End: 2017-11-28

## 2017-11-28 NOTE — TELEPHONE ENCOUNTER
Patient needs to have 3 visits with Doctor and she's not sure if she needs 3 referrals to be able to see her diabetes doctor three times. Shireen Brady [facility] REPLACEMENT/ADVANTAGE - HMO) [address] , [address], [address]  ID/Cert# [id number]    Specialist Name: Colin Colindres   Address of specialist: [facility], [place] [address]  Phone: [phone]  Fax: [phone]  Reason Why: treatment for diabetes  Appt.  Date and time: December 8,2017 at 10:30am     11/28/17  Shekhar White

## 2017-12-12 ENCOUNTER — OFFICE VISIT (OUTPATIENT)
Dept: FAMILY MEDICINE CLINIC | Age: 57
End: 2017-12-12

## 2017-12-12 VITALS
BODY MASS INDEX: 33.72 KG/M2 | HEART RATE: 90 BPM | OXYGEN SATURATION: 95 % | TEMPERATURE: 97.8 F | DIASTOLIC BLOOD PRESSURE: 66 MMHG | HEIGHT: 65 IN | RESPIRATION RATE: 16 BRPM | WEIGHT: 202.4 LBS | SYSTOLIC BLOOD PRESSURE: 122 MMHG

## 2017-12-12 DIAGNOSIS — E66.9 OBESITY (BMI 30-39.9): ICD-10-CM

## 2017-12-12 DIAGNOSIS — Z23 ENCOUNTER FOR IMMUNIZATION: ICD-10-CM

## 2017-12-12 DIAGNOSIS — I10 ESSENTIAL HYPERTENSION: Primary | ICD-10-CM

## 2017-12-12 DIAGNOSIS — E10.40 TYPE 1 DIABETES MELLITUS WITH DIABETIC NEUROPATHY (HCC): ICD-10-CM

## 2017-12-12 DIAGNOSIS — F33.41 RECURRENT MAJOR DEPRESSIVE DISORDER, IN PARTIAL REMISSION (HCC): ICD-10-CM

## 2017-12-12 RX ORDER — INSULIN ASPART 100 [IU]/ML
INJECTION, SOLUTION INTRAVENOUS; SUBCUTANEOUS
COMMUNITY

## 2017-12-12 NOTE — PROGRESS NOTES
Pt presents to office today for a follow up on HTN and Diabetes. . Pt also reports that she is using another inhaler in place of her BREO , because her insurance will not cover, but not sure about the name, will call office back on that. Pt states she saw her endocrinologist 12/8/2017 for a follow up on her Diabetes and will fax over her labs. Chief Complaint   Patient presents with    Diabetes     Follow up.  Hypertension     Follow up    Ear Fullness     For about 1 week, both ears. 1. Have you been to the ER, urgent care clinic since your last visit? Hospitalized since your last visit? No    2. Have you seen or consulted any other health care providers outside of the [address] [place] [address] since your last visit? Include any pap smears or colon screening. Yes to Dr. Suman Craig. (Endo)

## 2017-12-12 NOTE — PROGRESS NOTES
Alexx Edmondson [address] [facility]  [address] [place], [address]  [phone]             Date of visit: 17   Subjective:      History obtained from:  the patient. Urvashi Delaney is a 62 y.o. female who presents today for f/u chronic problems    Does occasionally have low sugars, will only get a headache and odd feeling  Still on the pump, switched to a new one, meditronix    bp has been fine when she has had it checked    Feeling better back on wellbutrin, thinks it is helping enough  Sad about family members being sick but not depressed   No thoughts of suicide  Cousin  of low sugar, that makes family worry about her  Nephew was sick  Father in law dx brain cancer  Can still enjoy life, enjoys sunrise and Vijay decorations      Plantar fasciitis is some better  Not exercising but could start walking some  Knows not to jump or walk on rocks    Insurance changed breo to something else not sure what but seems to be working well for asthma control    Feels pressure in left ear    Patient Active Problem List    Diagnosis Date Noted    Essential hypertension 2017    Obesity (BMI 30-39.9) 2017    Type 1 diabetes mellitus with diabetic neuropathy ([facility] 2017    Mild persistent asthma without complication     Pre-ulcerative calluses 2017    Plantar fasciitis, right 2017    Diabetic peripheral neuropathy associated with type 1 diabetes mellitus ([facility] 2017    Recurrent major depressive disorder, in partial remission ([facility] 2017     Current Outpatient Prescriptions   Medication Sig Dispense Refill    insulin aspart (NOVOLOG) 100 unit/mL injection by SubCUTAneous route.  albuterol (PROVENTIL HFA, VENTOLIN HFA, PROAIR HFA) 90 mcg/actuation inhaler Take 2 Puffs by inhalation every four (4) hours as needed for Wheezing. 1 Inhaler 0    losartan (COZAAR) 25 mg tablet Take 1 Tab by mouth daily.  Pt is taking 25 mg dose      atorvastatin (LIPITOR) 40 mg tablet Take 1 Tab by mouth nightly. Replaces pravastatin 90 Tab 3    cholecalciferol (VITAMIN D3) 400 unit tab tablet Take  by mouth daily.  cyanocobalamin (VITAMIN B-12) 1,000 mcg tablet Take 1,000 mcg by mouth daily.  CALCIUM CITRATE/VITAMIN D3 (CITRACAL + D PO) Take  by mouth.  buPROPion XL (WELLBUTRIN XL) 150 mg tablet Take 1 Tab by mouth every morning. 90 Tab 3    DULoxetine (CYMBALTA) 60 mg capsule Take 1 Cap by mouth daily. 90 Cap 3    fluticasone-vilanterol (BREO ELLIPTA) 200-25 mcg/dose inhaler Take 1 Puff by inhalation daily. 1 Inhaler 0    insulin glulisine (APIDRA) 100 unit/mL injection by SubCUTAneous route.  Uses 40 units daily with insulin pump      OTHER Bilateral diabetic orthotic shoes for callouses L84 and neuropathy with type 1 DM E10.42 RONNIE 99mo prog fair 2 Each 1     Allergies   Allergen Reactions    Lyrica [Pregabalin] Vertigo     Past Medical History:   Diagnosis Date    Asthma     Diabetes ([address]     Hypertension      Past Surgical History:   Procedure Laterality Date    HX CARPAL TUNNEL RELEASE      bilat    HX ORTHOPAEDIC       Family History   Problem Relation Age of Onset    Diabetes Mother     Heart Disease Mother     Heart Disease Father     Cancer Sister      braest    No Known Problems Sister     No Known Problems Sister     No Known Problems Sister     No Known Problems Sister     Cancer Sister      stomach ca     Social History   Substance Use Topics    Smoking status: Never Smoker    Smokeless tobacco: Never Used    Alcohol use No      Social History     Social History Narrative    Lives with one sister, has other sisters        Review of Systems  Card: denies chest pain  Pulm: denies shortness of breath         Objective:     Vitals:    12/12/17 1621   BP: 122/66   Pulse: 90   Resp: 16   Temp: 97.8 °F (36.6 °C)   TempSrc: Oral   SpO2: 95%   Weight: 202 lb 6.4 oz (91.8 kg)   Height: 5' 5\" (1.651 m)     Body mass index is 33.68 kg/(m^2). General: stated age, well developed, well nourished and in NAD  Neck: supple, symmetrical, trachea midline, no adenopathy and thyroid: not enlarged, symmetric, no tenderness/mass/nodules  Lungs:  clear to auscultation w/o rales, rhonchi, wheezes w/normal effort and no use of accessory muscles of respiration   Heart: regular rate and rhythm, S1, S2 normal, no murmur, click, rub or gallop  Ext:  No edema noted. Lymph: no cervical adenopathy appreciated  Skin:  Normal. and no rash or abnormalities   Psych: alert and oriented to person, place, time and situation and Speech: appropriate quality, quantity and organization of sentences     Assessment/Plan:       ICD-10-CM ICD-9-CM    1. Essential hypertension I10 401.9    2. Type 1 diabetes mellitus with diabetic neuropathy (HCC) E10.40 250.61      357.2    3. Obesity (BMI 30-39. 9) E66.9 278.00    4. Recurrent major depressive disorder, in partial remission ([facility] F33.41 296.35    5. Encounter for immunization Z23 V03.89 INFLUENZA VIRUS VAC QUAD,SPLIT,PRESV FREE SYRINGE IM        Orders Placed This Encounter    Influenza virus vaccine (QUADRIVALENT PRES FREE SYRINGE) IM (99172)    insulin aspart (NOVOLOG) 100 unit/mL injection       Chronic problems stable  No med changes  wellbutrin has helped depression  Encouraged more regular exercise although will have to be careful of plantar fasciitis  Followed closely by endocrine for DM but emphasized importance of diet today and also avoiding low sugars, letting me or endocrinologist know if sugar low. Discussed the diagnosis and plan and she expressed understanding. Follow-up Disposition:  Return in about 6 months (around 6/12/2018) for Annual Wellness Visit.     Artemio Joyner MD

## 2017-12-12 NOTE — MR AVS SNAPSHOT
Visit Information Date & Time Provider Department Dept. Phone Encounter #  
 12/12/2017  3:45 PM Anna Clemens, [address] [facility] [address] [phone] [id number] Follow-up Instructions Return in about 6 months (around 6/12/2018) for Annual Wellness Visit. Upcoming Health Maintenance Date Due Pneumococcal 19-64 Medium Risk (1 of 1 - PPSV23) 10/28/1979 DTaP/Tdap/Td series (1 - Tdap) 10/28/1981 PAP AKA CERVICAL CYTOLOGY 10/28/1981 FOBT Q 1 YEAR AGE 50-75 10/28/2010 HEMOGLOBIN A1C Q6M 11/25/2017 FOOT EXAM Q1 5/25/2018 MICROALBUMIN Q1 5/25/2018 LIPID PANEL Q1 5/25/2018 EYE EXAM RETINAL OR DILATED Q1 8/1/2018 Allergies as of 12/12/2017  Review Complete On: 12/12/2017 By: Anna Clemens MD  
  
 Severity Noted Reaction Type Reactions Lyrica [Pregabalin]  05/25/2017   Intolerance Vertigo Current Immunizations  Reviewed on 12/12/2017 Name Date Influenza Vaccine (Quad) PF 12/12/2017 Rotavirus Vaccine 3/13/2015 Reviewed by Cale Villarreal LPN on 92/49/1427 at  4:39 PM  
You Were Diagnosed With   
  
 Codes Comments Encounter for immunization    -  Primary ICD-10-CM: A12 ICD-9-CM: V03.89 Type 1 diabetes mellitus with diabetic neuropathy (HCC)     ICD-10-CM: E10.40 ICD-9-CM: 250.61, 357.2 Essential hypertension     ICD-10-CM: I10 
ICD-9-CM: 401.9 Obesity (BMI 30-39. 9)     ICD-10-CM: E66.9 ICD-9-CM: 278.00 Recurrent major depressive disorder, in partial [facility] ([facility]     ICD-10-CM: F33.41 
ICD-9-CM: 296.35 Vitals BP Pulse Temp Resp Height(growth percentile) Weight(growth percentile) 122/66 90 97.8 °F (36.6 °C) (Oral) 16 5' 5\" (1.651 m) 202 lb 6.4 oz (91.8 kg) SpO2 BMI OB Status Smoking Status 95% 33.68 kg/m2 Postmenopausal Never Smoker Vitals History BMI and BSA Data Body Mass Index Body Surface Area  
 33.68 kg/m 2 2.05 m 2 Preferred Pharmacy Pharmacy Name Phone [facility] [place], [place] - [address] [place] [address] [phone] Your Updated Medication List  
  
   
This list is accurate as of: 12/12/17  4:58 PM.  Always use your most recent med list.  
  
  
  
  
 albuterol 90 mcg/actuation inhaler Commonly known as:  PROVENTIL HFA, VENTOLIN HFA, PROAIR HFA Take 2 Puffs by inhalation every four (4) hours as needed for Wheezing. APIDRA 100 unit/mL injection Generic drug:  insulin glulisine  
by SubCUTAneous route. Uses 40 units daily with insulin pump  
  
 atorvastatin 40 mg tablet Commonly known as:  LIPITOR Take 1 Tab by mouth nightly. Replaces pravastatin buPROPion  mg tablet Commonly known as:  Johnice Martinez Take 1 Tab by mouth every morning. cholecalciferol 400 unit Tab tablet Commonly known as:  VITAMIN D3 Take  by mouth daily. CITRACAL + D PO Take  by mouth. DULoxetine 60 mg capsule Commonly known as:  CYMBALTA Take 1 Cap by mouth daily. fluticasone-vilanterol 200-25 mcg/dose inhaler Commonly known as:  BREO ELLIPTA Take 1 Puff by inhalation daily. losartan 25 mg tablet Commonly known as:  COZAAR Take 1 Tab by mouth daily. Pt is taking 25 mg dose NovoLOG 100 unit/mL injection Generic drug:  insulin aspart  
by SubCUTAneous route. OTHER Bilateral diabetic orthotic shoes for callouses L84 and neuropathy with type 1 DM E10.42 RONNIE 99mo prog fair VITAMIN B-12 1,000 mcg tablet Generic drug:  cyanocobalamin Take 1,000 mcg by mouth daily. We Performed the Following INFLUENZA VIRUS VAC QUAD,SPLIT,PRESV FREE SYRINGE IM [id number] CPT(R)] Follow-up Instructions Return in about 6 months (around 6/12/2018) for Annual Wellness Visit. Introducing [facility]! Dear [place]: 
Thank you for requesting a [employer] account.   Our records indicate that you already have an active [employer] account. You can access your account anytime at [email or web address] Did you know that you can access your hospital and ER discharge instructions at any time in [employer]? You can also review all of your test results from your hospital stay or ER visit. Additional Information If you have questions, please visit the Frequently Asked Questions section of the [employer] website at [email or web address] Remember, [employer] is NOT to be used for urgent needs. For medical emergencies, dial 911. Now available from your iPhone and Android! Please provide this summary of care documentation to your next provider. Your primary care clinician is listed as Tracy Mckay. If you have any questions after today's visit, please call [phone].

## 2017-12-13 NOTE — PATIENT INSTRUCTIONS
Preventing a Relapse of Depression: Care Instructions  Your Care Instructions    A relapse of depression means your symptoms have come back after you have gotten better. This illness often comes and goes during a lifetime. But there are many things you can do to keep it from coming back. Follow-up care is a key part of your treatment and safety. Be sure to make and go to all appointments, and call your doctor if you are having problems. It's also a good idea to know your test results and keep a list of the medicines you take. What do you need to know? Know your risk of relapse  Talk to your doctor to find out if you are at risk of relapse. Many things can make a person more likely to relapse into depression. These include having a family member with depression, dealing with serious problems in a relationship or a job, having a serious medical condition, or abusing drugs or alcohol. It is important to know your risk and to recognize warning signs of relapse. Once you know these things, you will be better able to keep it from happening to you. Know the warning signs of relapse  The two most common signs of relapse are:  · Feeling sad or hopeless. · Losing interest in your daily activities. You may have other symptoms, such as:  · You lose or gain weight. · You sleep too much or not enough. · You feel restless and unable to sit still. · You feel unable to move. · You feel tired all the time. · You feel unworthy or guilty without an obvious reason. · You have problems concentrating, remembering, or making decisions. · You think often about death or suicide. · You feel angry or have panic attacks. How can you care for yourself at home? · Take your medicine as prescribed. Call your doctor if you have any problems with your medicine. Many people take their medicines for at least 6 months after they have recovered. This often helps keep symptoms from coming back.  However, if your depression keeps coming back, you may have to take medicine for the rest of your life. · Continue counseling even after you have stopped taking medicine. · Eat healthy foods. Include fruits, vegetables, beans, and whole grains in your diet each day. · Get at least 30 minutes of exercise on most days of the week. Walking is a good choice. You also may want to do other activities, such as running, swimming, cycling, or playing tennis or team sports. · See your doctor right away if you have new symptoms or feel that your depression is coming back. · Keep a regular sleep schedule. Try for 8 hours of sleep a night. · Avoid alcohol and illegal drugs. · Keep the numbers for these national suicide hotlines: 3-622-881-TALK (2-212.215.5974) and 9-544-JCNHNCA (7-233.595.1735). If you or someone you know talks about suicide or feeling hopeless, get help right away. When should you call for help? Call 911 anytime you think you may need emergency care. For example, call if:  ? · You are thinking about suicide or are threatening suicide. ? · You feel you cannot stop from hurting yourself or someone else. ? · You hear or see things that aren't real.   ? · You think or speak in a bizarre way that is not like your usual behavior. ?Call your doctor now or seek immediate medical care if:  ? · You are drinking a lot of alcohol or using illegal drugs. ? · You are talking or writing about death. ? Watch closely for changes in your health, and be sure to contact your doctor if:  ? · You find it hard or it's getting harder to deal with school, a job, family, or friends. ? · You think your treatment is not helping or you are not getting better. ? · Your symptoms get worse or you get new symptoms. ? · You have any problems with your antidepressant medicines, such as side effects, or you are thinking about stopping your medicine.    ? · You are having manic behavior, such as having very high energy, needing less sleep than normal, or showing risky behavior such as spending money you don't have or abusing others verbally or physically. Where can you learn more? Go to http://radha-jose antonio.info/. Enter L931 in the search box to learn more about \"Preventing a Relapse of Depression: Care Instructions. \"  Current as of: May 12, 2017  Content Version: 11.4  © 0679-1665 [employer]. Care instructions adapted under license by [employer] (which disclaims liability or warranty for this information). If you have questions about a medical condition or this instruction, always ask your healthcare professional. [place] [address] any warranty or liability for your use of this information.

## 2018-01-08 ENCOUNTER — HOSPITAL ENCOUNTER (EMERGENCY)
Age: 58
Discharge: HOME | End: 2018-01-08
Attending: FAMILY MEDICINE

## 2018-01-08 VITALS
RESPIRATION RATE: 16 BRPM | TEMPERATURE: 97.9 F | DIASTOLIC BLOOD PRESSURE: 70 MMHG | OXYGEN SATURATION: 97 % | HEIGHT: 65 IN | BODY MASS INDEX: 33.82 KG/M2 | SYSTOLIC BLOOD PRESSURE: 136 MMHG | WEIGHT: 203 LBS | HEART RATE: 74 BPM
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
## 2018-01-08 RX ORDER — CEFTRIAXONE 1 G/1
1 INJECTION, POWDER, FOR SOLUTION INTRAMUSCULAR; INTRAVENOUS EVERY 24 HOURS
Status: DISCONTINUED | OUTPATIENT
Start: 2018-01-08 | End: 2018-01-08

## 2018-01-08 RX ORDER — CEFTRIAXONE 1 G/1
1 INJECTION, POWDER, FOR SOLUTION INTRAMUSCULAR; INTRAVENOUS ONCE
Status: DISCONTINUED | OUTPATIENT
Start: 2018-01-08 | End: 2018-01-08 | Stop reason: HOSPADM

## 2018-01-08 RX ORDER — AMOXICILLIN AND CLAVULANATE POTASSIUM 875; 125 MG/1; MG/1
1 TABLET, FILM COATED ORAL 2 TIMES DAILY
Qty: 20 TAB | Refills: 0 | Status: SHIPPED | OUTPATIENT
Start: 2018-01-08 | End: 2018-01-18

## 2018-01-08 RX ADMIN — CEFTRIAXONE 1 G: 1 INJECTION, POWDER, FOR SOLUTION INTRAMUSCULAR; INTRAVENOUS at 18:50

## 2018-01-09 NOTE — UC PROVIDER NOTE
Patient is a 62 y.o. female presenting with cat scratch. Cat scratch    Episode onset: four days ago. The incident occurred at home. There is an injury to the abdomen. The pain is mild. Pertinent negatives include no abdominal pain and no bowel incontinence. There have been no prior injuries to these areas. Past Medical History:   Diagnosis Date    Asthma     Diabetes ([address]     Hypertension         Past Surgical History:   Procedure Laterality Date    HX CARPAL TUNNEL RELEASE      bilat    HX ORTHOPAEDIC           Family History   Problem Relation Age of Onset    Diabetes Mother     Heart Disease Mother     Heart Disease Father     Cancer Sister      braest    No Known Problems Sister     No Known Problems Sister     No Known Problems Sister     No Known Problems Sister     Cancer Sister      stomach ca        Social History     Social History    Marital status:      Spouse name: N/A    Number of children: N/A    Years of education: N/A     Occupational History    Not on file. Social History Main Topics    Smoking status: Never Smoker    Smokeless tobacco: Never Used    Alcohol use No    Drug use: No    Sexual activity: Not Currently     Other Topics Concern    Not on file     Social History Narrative    Lives with one sister, has other sisters                ALLERGIES: Lyrica [pregabalin]    Review of Systems   Constitutional: Negative for chills. HENT: Negative for congestion. Gastrointestinal: Negative for abdominal pain and bowel incontinence. Skin: Positive for color change. Vitals:    01/08/18 1838   BP: 136/70   Pulse: 74   Resp: 16   Temp: 97.9 °F (36.6 °C)   SpO2: 97%   Weight: 92.1 kg (203 lb)   Height: 5' 5\" (1.651 m)       Physical Exam   Constitutional: She is oriented to person, place, and time. She appears well-developed and well-nourished. Eyes: Conjunctivae and EOM are normal.   Cardiovascular: Normal rate and regular rhythm. Pulmonary/Chest: Effort normal and breath sounds normal.   Neurological: She is alert and oriented to person, place, and time. Skin: Skin is warm and dry. There is erythema. Large area of erythema on abdomen   Psychiatric: She has a normal mood and affect. Her behavior is normal. Judgment and thought content normal.   Nursing note and vitals reviewed. MDM     Differential Diagnosis; Clinical Impression; Plan:     CLINICAL IMPRESSION:  Cat scratch  (primary encounter diagnosis)    Plan:  1. Rocephin 1 g given  2. augmentin 875 BID  3. If signs of systemic infection occur, go to ER  Risk of Significant Complications, Morbidity, and/or Mortality:   Presenting problems: Moderate  Diagnostic procedures: Moderate  Management options:   Moderate  Progress:   Patient progress:  Stable      Procedures

## 2018-02-19 ENCOUNTER — OFFICE VISIT (OUTPATIENT)
Dept: FAMILY MEDICINE CLINIC | Age: 58
End: 2018-02-19

## 2018-02-19 VITALS
TEMPERATURE: 98.6 F | OXYGEN SATURATION: 95 % | HEART RATE: 91 BPM | DIASTOLIC BLOOD PRESSURE: 47 MMHG | HEIGHT: 65 IN | BODY MASS INDEX: 34.7 KG/M2 | WEIGHT: 208.25 LBS | RESPIRATION RATE: 16 BRPM | SYSTOLIC BLOOD PRESSURE: 127 MMHG

## 2018-02-19 DIAGNOSIS — L70.0 CYSTIC ACNE: Primary | ICD-10-CM

## 2018-02-19 RX ORDER — CLINDAMYCIN AND BENZOYL PEROXIDE 10; 50 MG/G; MG/G
GEL TOPICAL 2 TIMES DAILY
Qty: 1 TUBE | Refills: 0 | Status: SHIPPED | OUTPATIENT
Start: 2018-02-19 | End: 2018-12-13

## 2018-02-19 RX ORDER — DOXYCYCLINE 100 MG/1
100 CAPSULE ORAL 2 TIMES DAILY
Qty: 20 CAP | Refills: 0 | Status: SHIPPED | OUTPATIENT
Start: 2018-02-19 | End: 2018-03-01

## 2018-02-19 NOTE — PROGRESS NOTES
Chief Complaint   Patient presents with    Skin Problem     on right side of face x 1 week. sore to touch     1. Have you been to the ER, urgent care clinic since your last visit? Hospitalized since your last visit? No    2. Have you seen or consulted any other health care providers outside of the [address] [place] [address] since your last visit? Include any pap smears or colon screening.  No

## 2018-02-20 NOTE — PROGRESS NOTES
HISTORY OF PRESENT ILLNESS  Michael Del Rosario is a 62 y.o. female. HPI  C/o recurrent acne type lesions on her face. Most recent occurred a few days ago on her right cheek. She expressed some purulent discharge. Has not completely healed. Using antibacterial cleanser. Was treated last month for a boil in her groin with augmentin. History of T2DM, followed by endocrinologist.  Reports last A1C around 8. Past medical history, social history, family history and medications were reviewed and updated. Blood pressure 127/47, pulse 91, temperature 98.6 °F (37 °C), temperature source Oral, resp. rate 16, height 5' 5\" (1.651 m), weight 208 lb 4 oz (94.5 kg), SpO2 95 %. Review of Systems   Constitutional: Negative for chills, fever and malaise/fatigue. Respiratory: Negative. Cardiovascular: Negative. Skin:        Lesions as stated. All other systems reviewed and are negative. Physical Exam   Constitutional: No distress. Neck: Neck supple. Cardiovascular: Normal rate, regular rhythm and normal heart sounds. Pulmonary/Chest: Effort normal and breath sounds normal.   Lymphadenopathy:     She has no cervical adenopathy. Skin: Skin is warm and dry. Scabbed lesions noted on right cheek and chin area. Few erythematous papules on chin. No pustules. ASSESSMENT and PLAN  Diagnoses and all orders for this visit:    1. Cystic acne  -     clindamycin-benzoyl peroxide (BENZACLIN) 1-5 % topical gel; Apply  to affected area two (2) times a day. Apply to affected area after the skin has been cleansed and dried. -     doxycycline (VIBRAMYCIN) 100 mg capsule; Take 1 Cap by mouth two (2) times a day for 10 days. Wash with antibacterial soap bid. Trial benzaclin as directed. Doxycycline as directed. Medication risks, benefits and potential side effects were reviewed. Follow up with endocrine for diabetes. Stressed importance of diabetes control to avoid skin infections.       Derm referral [facility] with above.

## 2018-07-30 RX ORDER — PRAVASTATIN SODIUM 40 MG/1
40 TABLET ORAL
Qty: 30 TAB | Refills: 0 | OUTPATIENT
Start: 2018-07-30

## 2018-07-30 RX ORDER — DULOXETIN HYDROCHLORIDE 60 MG/1
60 CAPSULE, DELAYED RELEASE ORAL DAILY
Qty: 30 CAP | Refills: 0 | Status: SHIPPED | OUTPATIENT
Start: 2018-07-30 | End: 2018-08-25 | Stop reason: SDUPTHER

## 2018-07-30 RX ORDER — ATORVASTATIN CALCIUM 40 MG/1
40 TABLET, FILM COATED ORAL
Qty: 30 TAB | Refills: 0 | Status: SHIPPED | OUTPATIENT
Start: 2018-07-30 | End: 2018-09-06 | Stop reason: SDUPTHER

## 2018-08-25 ENCOUNTER — TELEPHONE (OUTPATIENT)
Dept: FAMILY MEDICINE CLINIC | Age: 58
End: 2018-08-25

## 2018-08-25 RX ORDER — DULOXETIN HYDROCHLORIDE 60 MG/1
CAPSULE, DELAYED RELEASE ORAL
Qty: 30 CAP | Refills: 0 | Status: SHIPPED | OUTPATIENT
Start: 2018-08-25 | End: 2018-10-04 | Stop reason: SDUPTHER

## 2018-08-27 NOTE — TELEPHONE ENCOUNTER
Pharmacy states patient would like a 90 day supply on medication DULOXETINE 60MG     Pharmacy on file verified

## 2018-08-28 NOTE — TELEPHONE ENCOUNTER
Called pharmacy to let them know that we only did 30 day supply b/c pt is due a f/u appt. asked them to put a note on rx asking pt to call back and schedule appt.

## 2018-09-04 ENCOUNTER — PATIENT MESSAGE (OUTPATIENT)
Dept: FAMILY MEDICINE CLINIC | Age: 58
End: 2018-09-04

## 2018-09-06 RX ORDER — ATORVASTATIN CALCIUM 40 MG/1
40 TABLET, FILM COATED ORAL
Qty: 30 TAB | Refills: 0 | Status: SHIPPED | OUTPATIENT
Start: 2018-09-06 | End: 2018-09-21 | Stop reason: SDUPTHER

## 2018-09-07 NOTE — TELEPHONE ENCOUNTER
From: [place]  To: Darien Turpin MD  Sent: 9/4/2018 2:40 PM EDT  Subject: Prescription Question    hello Dr. Ana María Sam,    Can you prescrible an replacement for this medicaton?      Refill declined on 7/30/2018  - pravastatin (PRAVACHOL) 40 mg tablet [Pharmacy Med Name: PRAVASTATIN 40MG TAB]   Refusal Reason:  Medication Discontinued      thanks

## 2018-09-21 RX ORDER — ATORVASTATIN CALCIUM 40 MG/1
40 TABLET, FILM COATED ORAL
Qty: 30 TAB | Refills: 0 | Status: SHIPPED | OUTPATIENT
Start: 2018-09-21 | End: 2018-12-13 | Stop reason: SDUPTHER

## 2018-09-21 NOTE — TELEPHONE ENCOUNTER
Pharm verified on file. They are requesting a refill for 90-days supply. Last visit 02/19/18  Last refill 9/06/18    Requested Prescriptions     Pending Prescriptions Disp Refills    atorvastatin (LIPITOR) 40 mg tablet 30 Tab 0     Sig: Take 1 Tab by mouth nightly.  Replaces pravastatin

## 2018-10-04 RX ORDER — DULOXETIN HYDROCHLORIDE 60 MG/1
CAPSULE, DELAYED RELEASE ORAL
Qty: 30 CAP | Refills: 0 | Status: SHIPPED | OUTPATIENT
Start: 2018-10-04 | End: 2018-11-07 | Stop reason: SDUPTHER

## 2018-11-07 RX ORDER — DULOXETIN HYDROCHLORIDE 60 MG/1
CAPSULE, DELAYED RELEASE ORAL
Qty: 30 CAP | Refills: 0 | Status: SHIPPED | OUTPATIENT
Start: 2018-11-07 | End: 2018-12-05 | Stop reason: SDUPTHER

## 2018-12-13 ENCOUNTER — DOCUMENTATION ONLY (OUTPATIENT)
Dept: FAMILY MEDICINE CLINIC | Age: 58
End: 2018-12-13

## 2018-12-13 ENCOUNTER — OFFICE VISIT (OUTPATIENT)
Dept: FAMILY MEDICINE CLINIC | Age: 58
End: 2018-12-13

## 2018-12-13 VITALS
RESPIRATION RATE: 18 BRPM | OXYGEN SATURATION: 98 % | SYSTOLIC BLOOD PRESSURE: 130 MMHG | HEIGHT: 65 IN | DIASTOLIC BLOOD PRESSURE: 72 MMHG | WEIGHT: 209 LBS | BODY MASS INDEX: 34.82 KG/M2 | TEMPERATURE: 98.1 F | HEART RATE: 78 BPM

## 2018-12-13 DIAGNOSIS — F33.41 RECURRENT MAJOR DEPRESSIVE DISORDER, IN PARTIAL REMISSION (HCC): ICD-10-CM

## 2018-12-13 DIAGNOSIS — E10.42 DIABETIC PERIPHERAL NEUROPATHY ASSOCIATED WITH TYPE 1 DIABETES MELLITUS (HCC): ICD-10-CM

## 2018-12-13 DIAGNOSIS — J45.30 MILD PERSISTENT ASTHMA WITHOUT COMPLICATION: ICD-10-CM

## 2018-12-13 DIAGNOSIS — E10.40 TYPE 1 DIABETES MELLITUS WITH DIABETIC NEUROPATHY (HCC): ICD-10-CM

## 2018-12-13 DIAGNOSIS — E66.9 OBESITY (BMI 30-39.9): ICD-10-CM

## 2018-12-13 DIAGNOSIS — I10 ESSENTIAL HYPERTENSION: ICD-10-CM

## 2018-12-13 DIAGNOSIS — Z00.00 MEDICARE ANNUAL WELLNESS VISIT, INITIAL: Primary | ICD-10-CM

## 2018-12-13 RX ORDER — ATORVASTATIN CALCIUM 40 MG/1
40 TABLET, FILM COATED ORAL
Qty: 90 TAB | Refills: 3 | Status: SHIPPED | OUTPATIENT
Start: 2018-12-13 | End: 2020-02-25 | Stop reason: SDUPTHER

## 2018-12-13 RX ORDER — LOSARTAN POTASSIUM 25 MG/1
25 TABLET ORAL DAILY
Qty: 90 TAB | Refills: 3 | Status: SHIPPED | OUTPATIENT
Start: 2018-12-13 | End: 2020-02-25 | Stop reason: SDUPTHER

## 2018-12-13 RX ORDER — ALBUTEROL SULFATE 90 UG/1
2 AEROSOL, METERED RESPIRATORY (INHALATION)
Qty: 1 INHALER | Refills: 3 | Status: SHIPPED | OUTPATIENT
Start: 2018-12-13 | End: 2020-02-25 | Stop reason: SDUPTHER

## 2018-12-13 RX ORDER — DULOXETIN HYDROCHLORIDE 60 MG/1
CAPSULE, DELAYED RELEASE ORAL
Qty: 90 CAP | Refills: 3 | Status: SHIPPED | OUTPATIENT
Start: 2018-12-13 | End: 2020-01-13

## 2018-12-13 RX ORDER — BUPROPION HYDROCHLORIDE 150 MG/1
TABLET ORAL
Qty: 90 TAB | Refills: 3 | Status: SHIPPED | OUTPATIENT
Start: 2018-12-13 | End: 2020-02-25 | Stop reason: DRUGHIGH

## 2018-12-13 RX ORDER — FLUTICASONE FUROATE AND VILANTEROL 200; 25 UG/1; UG/1
1 POWDER RESPIRATORY (INHALATION) DAILY
Qty: 1 INHALER | Refills: 3 | Status: SHIPPED | OUTPATIENT
Start: 2018-12-13 | End: 2020-02-25 | Stop reason: SDUPTHER

## 2018-12-13 NOTE — ACP (ADVANCE CARE PLANNING)
Discussed with [address] Peres More her ability to prepare and advance directive in the case that an injury or illness causes her to be unable to make health care decisions. Doesn't have one but willing to make. Sent home with honoring choices folder.

## 2018-12-13 NOTE — PROGRESS NOTES
Chief Complaint   Patient presents with    Medication Evaluation     patient needs inhaler       1. Have you been to the ER, urgent care clinic since your last visit? Hospitalized since your last visit? No    2. Have you seen or consulted any other health care providers outside of the [address] [place] [address] since your last visit? Include any pap smears or colon screening.  No

## 2018-12-13 NOTE — PATIENT INSTRUCTIONS
Ok to use tylenol for your hand pains but try to avoid ibuprofen, it is not good for your kidneys    Ask [facility] to fax your shot record to me at [phone]  You could try to get the shingles vaccine             Well Visit, Women 50 to 72: Care Instructions  Your Care Instructions    Physical exams can help you stay healthy. Your doctor has checked your overall health and may have suggested ways to take good care of yourself. He or she also may have recommended tests. At home, you can help prevent illness with healthy eating, regular exercise, and other steps. Follow-up care is a key part of your treatment and safety. Be sure to make and go to all appointments, and call your doctor if you are having problems. It's also a good idea to know your test results and keep a list of the medicines you take. How can you care for yourself at home? · Reach and stay at a healthy weight. This will lower your risk for many problems, such as obesity, diabetes, heart disease, and high blood pressure. · Get at least 30 minutes of exercise on most days of the week. Walking is a good choice. You also may want to do other activities, such as running, swimming, cycling, or playing tennis or team sports. · Do not smoke. Smoking can make health problems worse. If you need help quitting, talk to your doctor about stop-smoking programs and medicines. These can increase your chances of quitting for good. · Protect your skin from too much sun. When you're outdoors from 10 a.m. to 4 p.m., stay in the shade or cover up with clothing and a hat with a wide brim. Wear sunglasses that block UV rays. Even when it's cloudy, put broad-spectrum sunscreen (SPF 30 or higher) on any exposed skin. · See a dentist one or two times a year for checkups and to have your teeth cleaned. · Wear a seat belt in the car. · Limit alcohol to 1 drink a day. Too much alcohol can cause health problems.   Follow your doctor's advice about when to have certain tests. These tests can spot problems early. · Cholesterol. Your doctor will tell you how often to have this done based on your age, family history, or other things that can increase your risk for heart attack and stroke. · Blood pressure. Have your blood pressure checked during a routine doctor visit. Your doctor will tell you how often to check your blood pressure based on your age, your blood pressure results, and other factors. · Mammogram. Ask your doctor how often you should have a mammogram, which is an X-ray of your breasts. A mammogram can spot breast cancer before it can be felt and when it is easiest to treat. · Pap test and pelvic exam. Ask your doctor how often you should have a Pap test. You may not need to have a Pap test as often as you used to. · Vision. Have your eyes checked every year or two or as often as your doctor suggests. Some experts recommend that you have yearly exams for glaucoma and other age-related eye problems starting at age 48. · Hearing. Tell your doctor if you notice any change in your hearing. You can have tests to find out how well you hear. · Diabetes. Ask your doctor whether you should have tests for diabetes. · Colon cancer. You should begin tests for colon cancer at age 48. You may have one of several tests. Your doctor will tell you how often to have tests based on your age and risk. Risks include whether you already had a precancerous polyp removed from your colon or whether your parents, sisters and brothers, or children have had colon cancer. · Thyroid disease. Talk to your doctor about whether to have your thyroid checked as part of a regular physical exam. Women have an increased chance of a thyroid problem. · Osteoporosis. You should begin tests for bone density at age 72. If you are younger than 72, ask your doctor whether you have factors that may increase your risk for this disease. You may want to have this test before age 72.   · Heart attack and stroke risk. At least every 4 to 6 years, you should have your risk for heart attack and stroke assessed. Your doctor uses factors such as your age, blood pressure, cholesterol, and whether you smoke or have diabetes to show what your risk for a heart attack or stroke is over the next 10 years. When should you call for help? Watch closely for changes in your health, and be sure to contact your doctor if you have any problems or symptoms that concern you. Where can you learn more? Go to http://radha-jose antonio.info/. Enter F735 in the search box to learn more about \"Well Visit, Women 50 to 72: Care Instructions. \"  Current as of: March 29, 2018  Content Version: 11.8  © 0896-4634 [employer]. Care instructions adapted under license by its learning (which disclaims liability or warranty for this information). If you have questions about a medical condition or this instruction, always ask your healthcare professional. [place] [address] any warranty or liability for your use of this information.

## 2018-12-13 NOTE — PROGRESS NOTES
Alexx Edmondson [facility]  [address]. [facility], [address]  [phone]             Date of visit: 12/13/2018       This is an Initial Medicare Annual Wellness Visit (AWV), (Performed more than 12 months after effective date of Medicare Part B enrollment and 12 months after last preventive visit, Once in a lifetime)    I have reviewed the patient's medical history in detail and updated the computerized patient record. Jodie Oliveira is a 62 y.o. female   History obtained from: the patient. Concerns today   (Patient understands that medical problems addressed today may incur additional cost as this is a preventive visit)  -asthma flared by cold air (exercise is another trigger)  -used to be on Dulera or Breo for control but has been off that in recent months.  -has had a bad attack 3-4 years ago  -doesn't feel differently off the breo or dulera. The cost has been an issue as well  -still seeing endocrinologist every few months for her type 1 DM  -a1c 7.8 which is a big improvement for her. Thinks she has gained a little weight. Does try with diet. Seeing counselor to help with motivation and positive thinking and thinks that helps her a lot more than seeing a dietician again. Has insulin pump, uses around 35 units of insulin per day. Likes the new meditronix pump. Less sore, also gets infected less  Does count her carbs    History     Patient Active Problem List   Diagnosis Code    Essential hypertension I10    Obesity (BMI 30-39. 9) E66.9    Type 1 diabetes mellitus with diabetic neuropathy (HCC) E10.40    Mild persistent asthma without complication O32.78    Pre-ulcerative calluses L84    Plantar fasciitis, right M72.2    Diabetic peripheral neuropathy associated with type 1 diabetes mellitus (HCC) E10.42    Recurrent major depressive disorder, in partial remission ([facility] [address] [address] F33.41     Past Medical History:   Diagnosis Date    Asthma     Diabetes ([address]     Hypertension       Past Surgical History:   Procedure Laterality Date    HX CARPAL TUNNEL RELEASE      bilat    HX COLONOSCOPY  10/28/2010    approx date, normal per patient    HX ORTHOPAEDIC       Allergies   Allergen Reactions    Lyrica [Pregabalin] Vertigo     Current Outpatient Medications   Medication Sig Dispense Refill    albuterol (PROVENTIL HFA, VENTOLIN HFA, PROAIR HFA) 90 mcg/actuation inhaler Take 2 Puffs by inhalation every four (4) hours as needed for Wheezing. 1 Inhaler 3    fluticasone-vilanterol (BREO ELLIPTA) 200-25 mcg/dose inhaler Take 1 Puff by inhalation daily. Uses prn for attacks 1 Inhaler 3    DULoxetine (CYMBALTA) 60 mg capsule TAKE 1 CAPSULE BY MOUTH ONCE DAILY 90 Cap 3    buPROPion XL (WELLBUTRIN XL) 150 mg tablet TAKE 1 TABLET BY MOUTH ONCE DAILY IN THE MORNING 90 Tab 3    atorvastatin (LIPITOR) 40 mg tablet Take 1 Tab by mouth nightly. 90 Tab 3    losartan (COZAAR) 25 mg tablet Take 1 Tab by mouth daily. 90 Tab 3    varicella-zoster recombinant, PF, (SHINGRIX, PF,) 50 mcg/0.5 mL susr injection 0.5 mL by IntraMUSCular route once for 1 dose. 0.5 mL 1    insulin aspart (NOVOLOG) 100 unit/mL injection by SubCUTAneous route.  cholecalciferol (VITAMIN D3) 400 unit tab tablet Take  by mouth daily.  cyanocobalamin (VITAMIN B-12) 1,000 mcg tablet Take 1,000 mcg by mouth daily.  CALCIUM CITRATE/VITAMIN D3 (CITRACAL + D PO) Take  by mouth.       OTHER Bilateral diabetic orthotic shoes for callouses L84 and neuropathy with type 1 DM E10.42 RONNIE 99mo prog fair 2 Each 1     Family History   Problem Relation Age of Onset    Diabetes Mother     Heart Disease Mother     Heart Disease Father     Breast Cancer Sister     No Known Problems Sister     No Known Problems Sister     No Known Problems Sister     No Known Problems Sister     Cancer Sister         stomach ca     Social History     Tobacco Use    Smoking status: Never Smoker    Smokeless tobacco: Never Used Substance Use Topics    Alcohol use: No       Specialists/Care Team   [place] HERO Floyd has established care with the following healthcare providers:  Patient Care Team:  Irving Rodriguez MD as PCP - General (Family Practice)  Paulina Daily MD (Endocrinology)  Megan Murphy MD (Ophthalmology)  Logan Paniagua MD (Obstetrics & Gynecology)  Has a counselor she sees regularly    [address]     Demographics   female  62 y.o. General Health Questions   -During the past 4 weeks:   -how would you rate your health in general? Good   -how often have you been bothered by feeling dizzy when standing up? never   -how much have you been bothered by bodily pain? Yes, in hands. Had carpal tunnel bilaterally, also arthritis, knuckles hurt. Uses tylenol or motrin at times. Used to be on lyrica for neuropathy pain but made her light-headed. Has learned to just live with the neuropathy   -Have you noticed any hearing difficulties? Yes, has hearing aids but lost them, getting new ones by end of year. Hearing only mildly reduced, [facility] well [facility] [address] [facility]   -has your physical and emotional health limited your social activities with family or friends? no    Emotional Health Questions   -Do you have a history of depression, anxiety, or emotional problems? Yes, has little set backs but meds and counseling helping her depression  -Over the past 2 weeks, have you felt down, depressed or hopeless? no  -Over the past 2 weeks, have you felt little interest or pleasure in doing things? no    Health Habits   Please describe your diet habits: tries to eat well, follows DM diet  Do you get 5 servings of fruits or vegetables daily? Eats a frozen box of veggies daily  Do you exercise regularly?  Yes, walks dogs regularly, active around     Activities of Daily Living and Functional Status   -Do you need help with eating, walking, dressing, bathing, toileting, the phone, transportation, shopping, preparing meals, housework, laundry, medications or managing money? no  -In the past four weeks, was someone available to help you if you needed and wanted help with anything? Yes, lives with sister who will be retiring in January  -Are you confident are you that you can control and manage most of your health problems? yes  -Have you been given information to help you keep track of your medications? yes  -How often do you have trouble taking your medications as prescribed? never    Fall Risk and Home Safety   Have you fallen 2 or more times in the past year? no  Does your home have rugs in the hallway, lack grab bars in the bathroom, lack handrails on the stairs or have poor lighting? Doesn't have grab bars but doesn't need them, only 3 steps to get in the house  Do you have smoke detectors and check them regularly? Not sure  Do you have difficulties driving a car? no  Do you always fasten your seat belt when you are in a car? yes    Review of Systems (if indicated for problems addressed today)   Card: denies chest pain  GI: denies hematochezia   Psych: denies suicidal ideation     Physical Examination     Vitals:    12/13/18 1124 12/13/18 1159   BP: 150/54 130/72   Pulse: 78    Resp: 18    Temp: 98.1 °F (36.7 °C)    TempSrc: Oral    SpO2: 98%    Weight: 209 lb (94.8 kg)    Height: 5' 5\" (1.651 m)      Body mass index is 34.78 kg/m².    No exam data present  Was the patient's timed Up & Go test unsteady or longer than 30 seconds? no    Evaluation of Cognitive Function   Mood/affect:  happy  Orientation: normal  Appearance: age appropriate  Family member/caregiver input: none    Additional exam if indicated for problems addressed today:  General: stated age, obese and in NAD  Neck: supple, symmetrical, trachea midline, no adenopathy and thyroid: not enlarged, symmetric, no tenderness/mass/nodules  Lungs:  clear to auscultation w/o rales, rhonchi, but with just a few bilateral faint wheezes w/normal effort and no use of accessory muscles of respiration   Heart: regular rate and rhythm, S1, S2 normal, no murmur, click, rub or gallop  Abdomen: soft, nontender, no masses  Ext:  No edema noted. Lymph: no cervical adenopathy appreciated  Skin:  Normal. and no rash or abnormalities   Psych: alert and oriented to person, place, time and situation and Speech: appropriate quality, quantity and organization of sentences      Advice/Referrals/Counseling (as indicated)   Education and counseling provided for any problems identified above: diet, exercise    Preventive Services     Health Maintenance   Topic Date Due    COLONOSCOPY  10/28/1978    Pneumococcal 19-64 Medium Risk (1 of 1 - PPSV23) 10/28/1979    Shingrix Vaccine Age 50> (1 of 2) 10/28/2010    MEDICARE YEARLY EXAM  05/26/2018    HEMOGLOBIN A1C Q6M  06/13/2019    EYE EXAM RETINAL OR DILATED  08/01/2019    FOOT EXAM Q1  12/13/2019    MICROALBUMIN Q1  12/13/2019    LIPID PANEL Q1  12/13/2019    BREAST CANCER SCRN MAMMOGRAM  02/01/2020    PAP AKA CERVICAL CYTOLOGY  02/01/2021    DTaP/Tdap/Td series (2 - Td) 01/08/2028    Hepatitis C Screening  Completed    Influenza Age 5 to Adult  Completed      (Preventive care checklist to be included in patient instructions)  Discussed today Done Previously Not Needed     X at [facility]  Pneumococcal vaccines    x  Flu vaccine     x Hepatitis B vaccine (if at risk)   X rx sent   Shingles vaccine    x  TDAP vaccine    Per gyn 2/18  Mammogram    Per gyn 2/18  Pap smear    X age 48 normal cscope  Colorectal cancer screening     x Low-dose CT for lung cancer screening     x Bone density test    X yearly  Glaucoma screening    x  Cholesterol test    x  Diabetes screening test     x  Diabetes self-management class    x  Nutritionist referral for diabetes or renal disease     Discussion of Advance Directive   Discussed with [address] [place] [address] Dr. Simone Spencer her ability to prepare and advance directive in the case that an injury or illness causes her to be unable to make health care decisions. Doesn't have one but willing to make. Sent home with honoring choices folder. Assessment/Plan   Z00.00    ICD-10-CM ICD-9-CM    1. Medicare annual wellness visit, initial Z00.00 V70.0    2. Mild persistent asthma without complication U49.55 749.71    3. Type 1 diabetes mellitus with diabetic neuropathy (HCC) E10.40 250.61      357.2    4. Recurrent major depressive disorder, in partial remission ([facility] F33.41 296.35    5. Obesity (BMI 30-39. 9) E66.9 278.00    6. Essential hypertension I10 401.9    7. Diabetic peripheral neuropathy associated with type 1 diabetes mellitus (HCC) E10.42 250.61      357.2        Orders Placed This Encounter    albuterol (PROVENTIL HFA, VENTOLIN HFA, PROAIR HFA) 90 mcg/actuation inhaler    fluticasone-vilanterol (BREO ELLIPTA) 200-25 mcg/dose inhaler    DULoxetine (CYMBALTA) 60 mg capsule    buPROPion XL (WELLBUTRIN XL) 150 mg tablet    atorvastatin (LIPITOR) 40 mg tablet    losartan (COZAAR) 25 mg tablet    varicella-zoster recombinant, PF, (SHINGRIX, PF,) 50 mcg/0.5 mL susr injection     Preventive as above  Asthma fairly frequent symptoms but not wanting to do PFTs or stay on controller med. Works for her to use [employer] during attacks. Lungs sound good today. Will continue prn albuterol. Reviewed worrisome signs or symptoms for which to call. Dm followed closely by [facility] so not needing labs here  Mainly on the losartan for kidney protection but also working for her BP  Living with neuropathy, not wanting more meds  Advised tylenol safest option if needing something for hand arthritis occasionally  Encouraged to keep trying to lose weight    Follow-up Disposition:  Return in about 1 year (around 12/13/2019) for Annual Wellness Visit.     Gurpreet Mccormick MD

## 2019-01-14 NOTE — ACP (ADVANCE CARE PLANNING)
[facility] Invitation    Patient was invited to [facility] on this date and given the information folder for review. Recommended appointment with [facility] facilitator for ACP conversation regarding advance directives. [x] Yes  [] No  Referral sent to [facility] Choices team member or Coordinator for follow-up    [] Yes  [x] No  Patient scheduled an appointment.        Site of Referral:   [facility]

## 2019-02-19 ENCOUNTER — OFFICE VISIT (OUTPATIENT)
Dept: FAMILY MEDICINE CLINIC | Age: 59
End: 2019-02-19

## 2019-02-19 VITALS
SYSTOLIC BLOOD PRESSURE: 144 MMHG | OXYGEN SATURATION: 98 % | HEIGHT: 65 IN | RESPIRATION RATE: 18 BRPM | WEIGHT: 212.2 LBS | HEART RATE: 80 BPM | DIASTOLIC BLOOD PRESSURE: 68 MMHG | TEMPERATURE: 97.9 F | BODY MASS INDEX: 35.35 KG/M2

## 2019-02-19 DIAGNOSIS — H10.9 CONJUNCTIVITIS OF BOTH EYES, UNSPECIFIED CONJUNCTIVITIS TYPE: Primary | ICD-10-CM

## 2019-02-19 RX ORDER — POLYMYXIN B SULFATE AND TRIMETHOPRIM 1; 10000 MG/ML; [USP'U]/ML
SOLUTION OPHTHALMIC
Qty: 10 ML | Refills: 0 | Status: SHIPPED | OUTPATIENT
Start: 2019-02-19 | End: 2020-02-25

## 2019-02-19 NOTE — PATIENT INSTRUCTIONS
Pinkeye: Care Instructions  Your Care Instructions    Pinkeye is redness and swelling of the eye surface and the conjunctiva (the lining of the eyelid and the covering of the white part of the eye). Pinkeye is also called conjunctivitis. Pinkeye is often caused by infection with bacteria or a virus. Dry air, allergies, smoke, and chemicals are other common causes. Pinkeye often clears on its own in 7 to 10 days. Antibiotics only help if the pinkeye is caused by bacteria. Pinkeye caused by infection spreads easily. If an allergy or chemical is causing pinkeye, it will not go away unless you can avoid whatever is causing it. Follow-up care is a key part of your treatment and safety. Be sure to make and go to all appointments, and call your doctor if you are having problems. It's also a good idea to know your test results and keep a list of the medicines you take. How can you care for yourself at home? · Wash your hands often. Always wash them before and after you treat pinkeye or touch your eyes or face. · Use moist cotton or a clean, wet cloth to remove crust. Wipe from the inside corner of the eye to the outside. Use a clean part of the cloth for each wipe. · Put cold or warm wet cloths on your eye a few times a day if the eye hurts. · Do not wear contact lenses or eye makeup until the pinkeye is gone. Throw away any eye makeup you were using when you got pinkeye. Clean your contacts and storage case. If you wear disposable contacts, use a new pair when your eye has cleared and it is safe to wear contacts again. · If the doctor gave you antibiotic ointment or eyedrops, use them as directed. Use the medicine for as long as instructed, even if your eye starts looking better soon. Keep the bottle tip clean, and do not let it touch the eye area. · To put in eyedrops or ointment:  ? Tilt your head back, and pull your lower eyelid down with one finger. ?  Drop or squirt the medicine inside the lower lid.  ? Close your eye for 30 to 60 seconds to let the drops or ointment move around. ? Do not touch the ointment or dropper tip to your eyelashes or any other surface. · Do not share towels, pillows, or washcloths while you have pinkeye. When should you call for help? Call your doctor now or seek immediate medical care if:    · You have pain in your eye, not just irritation on the surface.     · You have a change in vision or loss of vision.     · You have an increase in discharge from the eye.     · Your eye has not started to improve or begins to get worse within 48 hours after you start using antibiotics.     · Pinkeye lasts longer than 7 days.    Watch closely for changes in your health, and be sure to contact your doctor if you have any problems. Where can you learn more? Go to http://radha-jose antonio.info/. Enter Y392 in the search box to learn more about \"Pinkeye: Care Instructions. \"  Current as of: September 23, 2018  Content Version: 11.9  © 5971-7672 [employer]. Care instructions adapted under license by [employer] (which disclaims liability or warranty for this information). If you have questions about a medical condition or this instruction, always ask your healthcare professional. [address] any warranty or liability for your use of this information.

## 2019-02-19 NOTE — PROGRESS NOTES
Patient Name: Dao Cleveland   MRN: [id number]    Lisa Zhong is a 62 y.o. female who presents with the following:     Has been waking up for the past 4 days with bilateral discharge and crusty eyelids. Difficult to open her eyes on her own. Will also develop a film across eyes throughout the day which makes it difficult to read. Denies any recent URI symptoms. No contact use. Sleeps with cats. Review of Systems   Constitutional: Negative for fever, malaise/fatigue and weight loss. Eyes: Positive for blurred vision and discharge. Negative for double vision, photophobia, pain and redness. Respiratory: Negative for cough, hemoptysis, shortness of breath and wheezing. Cardiovascular: Negative for chest pain, palpitations, leg swelling and PND. Gastrointestinal: Negative for abdominal pain, constipation, diarrhea, nausea and vomiting. The patient's medications, allergies, past medical history, surgical history, family history and social history were reviewed and updated where appropriate. Prior to Admission medications    Medication Sig Start Date End Date Taking? Authorizing Provider   albuterol (PROVENTIL HFA, VENTOLIN HFA, PROAIR HFA) 90 mcg/actuation inhaler Take 2 Puffs by inhalation every four (4) hours as needed for Wheezing. 12/13/18  Yes Matthew Cardoza MD   fluticasone-vilanterol (BREO ELLIPTA) 200-25 mcg/dose inhaler Take 1 Puff by inhalation daily. Uses prn for attacks 12/13/18  Yes Matthew Cardoza MD   DULoxetine (CYMBALTA) 60 mg capsule TAKE 1 CAPSULE BY MOUTH ONCE DAILY 12/13/18  Yes Matthew Cardoza MD   buPROPion XL (WELLBUTRIN XL) 150 mg tablet TAKE 1 TABLET BY MOUTH ONCE DAILY IN THE MORNING 12/13/18  Yes Matthew Cardoza MD   atorvastatin (LIPITOR) 40 mg tablet Take 1 Tab by mouth nightly. 12/13/18  Yes Matthew Cardoza MD   losartan (COZAAR) 25 mg tablet Take 1 Tab by mouth daily.  12/13/18  Yes Singh Vila MD   insulin aspart (NOVOLOG) 100 unit/mL injection by SubCUTAneous route. pump   Yes Provider, Historical   cholecalciferol (VITAMIN D3) 400 unit tab tablet Take 400 Units by mouth daily. Yes Provider, Historical   cyanocobalamin (VITAMIN B-12) 1,000 mcg tablet Take 1,000 mcg by mouth daily. Yes Provider, Historical   CALCIUM CITRATE/VITAMIN D3 (CITRACAL + D PO) Take  by mouth. Yes Provider, Historical   OTHER Bilateral diabetic orthotic shoes for callouses L84 and neuropathy with type 1 DM E10.42 RONNIE 99mo prog fair 5/25/17  Yes Saroj Cardoza MD       Allergies   Allergen Reactions    Lyrica [Pregabalin] Vertigo           OBJECTIVE    Visit Vitals  /68 (BP 1 Location: Left arm, BP Patient Position: Sitting)   Pulse 80   Temp 97.9 °F (36.6 °C) (Oral)   Resp 18   Ht 5' 5\" (1.651 m)   Wt 212 lb 3.2 oz (96.3 kg)   SpO2 98%   BMI 35.31 kg/m²       Physical Exam   Constitutional: She is oriented to person, place, and time and well-developed, well-nourished, and in no distress. No distress. Eyes: Conjunctivae, EOM and lids are normal. Pupils are equal, round, and reactive to light. Right eye exhibits no chemosis, no discharge, no exudate and no hordeolum. Left eye exhibits no chemosis, no discharge, no exudate and no hordeolum. Right conjunctiva is not injected. Left conjunctiva is not injected. No scleral icterus. Musculoskeletal: Normal range of motion. Neurological: She is alert and oriented to person, place, and time. Gait normal.   Skin: Skin is warm and dry. She is not diaphoretic. Psychiatric: Mood, memory, affect and judgment normal.   Nursing note and vitals reviewed. ASSESSMENT AND Roxanne Maryam is a 62 y.o. female who presents today for:    1. Conjunctivitis of both eyes, unspecified conjunctivitis type  Recommend warm compresses and start abx drops as below.  Reviewed signs and symptoms that would indicate a worsening medical condition which would require immediate evaluation and treatment; patient expressed understanding of plan. - trimethoprim-polymyxin b (POLYTRIM) ophthalmic solution; 2 drops four times daily for 7 days  Dispense: 10 mL; Refill: 0       There are no discontinued medications. Follow-up Disposition:  Return if symptoms worsen or fail to improve. Medication risks/benefits/costs/interactions/alternatives discussed with patient. Advised patient to call back or return to office if symptoms worsen/change/persist. If patient cannot reach us or should anything more severe/urgent arise he/she should proceed directly to the nearest emergency department. Discussed expected course/resolution/complications of diagnosis in detail with patient. Patient given a written after visit summary which includes his/her diagnoses, current medications and vitals. Patient expressed understanding with the diagnosis and plan. Rosi Edwards M.D.

## 2019-02-19 NOTE — PROGRESS NOTES
Chief Complaint   Patient presents with    Red Eye      bilateral - yellow discharge in the mornings, patient reports that she has not been able to see thinf clear x 3-4 days     1. Have you been to the ER, urgent care clinic since your last visit? Hospitalized since your last visit? No    2. Have you seen or consulted any other health care providers outside of the [address] [place] [address] since your last visit? Include any pap smears or colon screening.  No

## 2019-08-02 ENCOUNTER — TELEPHONE (OUTPATIENT)
Dept: FAMILY MEDICINE CLINIC | Age: 59
End: 2019-08-02

## 2019-08-02 DIAGNOSIS — E10.40 TYPE 1 DIABETES MELLITUS WITH DIABETIC NEUROPATHY (HCC): Primary | ICD-10-CM

## 2019-08-02 NOTE — TELEPHONE ENCOUNTER
Patient call requesting a referral / Dr. Zeny Das for diabetes.  She has appointment schedule for 08 05 2019 @ 3 PM . Once the order is complete please forward to me so I can complete the referral.    Thank you!!!    [facility]

## 2020-02-13 RX ORDER — DULOXETIN HYDROCHLORIDE 60 MG/1
CAPSULE, DELAYED RELEASE ORAL
Qty: 30 CAP | Refills: 0 | Status: SHIPPED | OUTPATIENT
Start: 2020-02-13 | End: 2020-02-25 | Stop reason: SDUPTHER

## 2020-02-25 ENCOUNTER — OFFICE VISIT (OUTPATIENT)
Dept: FAMILY MEDICINE CLINIC | Age: 60
End: 2020-02-25

## 2020-02-25 VITALS
RESPIRATION RATE: 18 BRPM | SYSTOLIC BLOOD PRESSURE: 135 MMHG | WEIGHT: 206 LBS | HEART RATE: 70 BPM | DIASTOLIC BLOOD PRESSURE: 76 MMHG | BODY MASS INDEX: 34.32 KG/M2 | TEMPERATURE: 98.2 F | OXYGEN SATURATION: 97 % | HEIGHT: 65 IN

## 2020-02-25 DIAGNOSIS — E10.42 DIABETIC PERIPHERAL NEUROPATHY ASSOCIATED WITH TYPE 1 DIABETES MELLITUS (HCC): ICD-10-CM

## 2020-02-25 DIAGNOSIS — E10.40 TYPE 1 DIABETES MELLITUS WITH DIABETIC NEUROPATHY (HCC): ICD-10-CM

## 2020-02-25 DIAGNOSIS — F33.41 RECURRENT MAJOR DEPRESSIVE DISORDER, IN PARTIAL REMISSION (HCC): ICD-10-CM

## 2020-02-25 DIAGNOSIS — Z00.00 MEDICARE ANNUAL WELLNESS VISIT, SUBSEQUENT: Primary | ICD-10-CM

## 2020-02-25 DIAGNOSIS — J45.30 MILD PERSISTENT ASTHMA WITHOUT COMPLICATION: ICD-10-CM

## 2020-02-25 DIAGNOSIS — I10 ESSENTIAL HYPERTENSION: ICD-10-CM

## 2020-02-25 DIAGNOSIS — L84 PRE-ULCERATIVE CALLUSES: ICD-10-CM

## 2020-02-25 DIAGNOSIS — K59.04 CHRONIC IDIOPATHIC CONSTIPATION: ICD-10-CM

## 2020-02-25 DIAGNOSIS — Z11.4 ENCOUNTER FOR SCREENING FOR HIV: ICD-10-CM

## 2020-02-25 DIAGNOSIS — E66.9 OBESITY (BMI 30-39.9): ICD-10-CM

## 2020-02-25 RX ORDER — ALBUTEROL SULFATE 90 UG/1
2 AEROSOL, METERED RESPIRATORY (INHALATION)
Qty: 1 INHALER | Refills: 3 | Status: SHIPPED | OUTPATIENT
Start: 2020-02-25

## 2020-02-25 RX ORDER — ATORVASTATIN CALCIUM 40 MG/1
40 TABLET, FILM COATED ORAL
Qty: 90 TAB | Refills: 3 | Status: SHIPPED | OUTPATIENT
Start: 2020-02-25

## 2020-02-25 RX ORDER — BUPROPION HYDROCHLORIDE 300 MG/1
300 TABLET ORAL
Qty: 90 TAB | Refills: 3 | Status: SHIPPED | OUTPATIENT
Start: 2020-02-25

## 2020-02-25 RX ORDER — OLOPATADINE HYDROCHLORIDE 1 MG/ML
2 SOLUTION/ DROPS OPHTHALMIC
Qty: 5 ML | Refills: 2 | Status: SHIPPED | OUTPATIENT
Start: 2020-02-25

## 2020-02-25 RX ORDER — LOSARTAN POTASSIUM 25 MG/1
25 TABLET ORAL DAILY
Qty: 90 TAB | Refills: 3 | Status: SHIPPED | OUTPATIENT
Start: 2020-02-25

## 2020-02-25 RX ORDER — FLUTICASONE FUROATE AND VILANTEROL 200; 25 UG/1; UG/1
1 POWDER RESPIRATORY (INHALATION) DAILY
Qty: 1 INHALER | Refills: 3 | Status: SHIPPED | OUTPATIENT
Start: 2020-02-25

## 2020-02-25 RX ORDER — DULOXETIN HYDROCHLORIDE 60 MG/1
60 CAPSULE, DELAYED RELEASE ORAL
Qty: 90 CAP | Refills: 3 | Status: SHIPPED | OUTPATIENT
Start: 2020-02-25

## 2020-02-25 NOTE — PATIENT INSTRUCTIONS
Alpha lipoic acid 600mg daily might help your neuropathy Ok to use miralax daily for constipation if you wanted Drinking more water is even better! For help with making an advanced directive (living will), please call our nurse navigator Prem Mirza RN, at [phone] Well Visit, Women 48 to 72: Care Instructions Your Care Instructions Physical exams can help you stay healthy. Your doctor has checked your overall health and may have suggested ways to take good care of yourself. He or she also may have recommended tests. At home, you can help prevent illness with healthy eating, regular exercise, and other steps. Follow-up care is a key part of your treatment and safety. Be sure to make and go to all appointments, and call your doctor if you are having problems. It's also a good idea to know your test results and keep a list of the medicines you take. How can you care for yourself at home? · Reach and stay at a healthy weight. This will lower your risk for many problems, such as obesity, diabetes, heart disease, and high blood pressure. · Get at least 30 minutes of exercise on most days of the week. Walking is a good choice. You also may want to do other activities, such as running, swimming, cycling, or playing tennis or team sports. · Do not smoke. Smoking can make health problems worse. If you need help quitting, talk to your doctor about stop-smoking programs and medicines. These can increase your chances of quitting for good. · Protect your skin from too much sun. When you're outdoors from 10 a.m. to 4 p.m., stay in the shade or cover up with clothing and a hat with a wide brim. Wear sunglasses that block UV rays. Even when it's cloudy, put broad-spectrum sunscreen (SPF 30 or higher) on any exposed skin. · See a dentist one or two times a year for checkups and to have your teeth cleaned. · Wear a seat belt in the car. Follow your doctor's advice about when to have certain tests. These tests can spot problems early. · Cholesterol. Your doctor will tell you how often to have this done based on your age, family history, or other things that can increase your risk for heart attack and stroke. · Blood pressure. Have your blood pressure checked during a routine doctor visit. Your doctor will tell you how often to check your blood pressure based on your age, your blood pressure results, and other factors. · Mammogram. Ask your doctor how often you should have a mammogram, which is an X-ray of your breasts. A mammogram can spot breast cancer before it can be felt and when it is easiest to treat. · Pap test and pelvic exam. Ask your doctor how often you should have a Pap test. You may not need to have a Pap test as often as you used to. · Vision. Have your eyes checked every year or two or as often as your doctor suggests. Some experts recommend that you have yearly exams for glaucoma and other age-related eye problems starting at age 48. · Hearing. Tell your doctor if you notice any change in your hearing. You can have tests to find out how well you hear. · Diabetes. Ask your doctor whether you should have tests for diabetes. · Colorectal cancer. Your risk for colorectal cancer gets higher as you get older. Some experts say that adults should start regular screening at age 48 and stop at age 76. Others say to start before age 48 or continue after age 76. Talk with your doctor about your risk and when to start and stop screening. · Thyroid disease. Talk to your doctor about whether to have your thyroid checked as part of a regular physical exam. Women have an increased chance of a thyroid problem. · Osteoporosis. You should begin tests for bone density at age 72. If you are younger than 72, ask your doctor whether you have factors that may increase your risk for this disease. You may want to have this test before age 72. · Heart attack and stroke risk. At least every 4 to 6 years, you should have your risk for heart attack and stroke assessed. Your doctor uses factors such as your age, blood pressure, cholesterol, and whether you smoke or have diabetes to show what your risk for a heart attack or stroke is over the next 10 years. When should you call for help? Watch closely for changes in your health, and be sure to contact your doctor if you have any problems or symptoms that concern you. Where can you learn more? Go to http://radha-jose antonio.info/. Enter T752 in the search box to learn more about \"Well Visit, Women 50 to 72: Care Instructions. \" Current as of: December 13, 2018 Content Version: 12.2 © 8862-4685 [employer]. Care instructions adapted under license by [employer] (which disclaims liability or warranty for this information). If you have questions about a medical condition or this instruction, always ask your healthcare professional. [place] [address] any warranty or liability for your use of this information.

## 2020-02-25 NOTE — PROGRESS NOTES
Alexx Edmondson [facility]  [address] [place], [address]  [phone]             Date of visit: 2/25/2020       This is a Subsequent Medicare Annual Wellness Visit (AWV), (Performed more than 12 months after effective date of Medicare Part B enrollment and 12 months after last preventive visit)    I have reviewed the patient's medical history in detail and updated the computerized patient record. Gabriela Antunez is a 61 y.o. female   History obtained from: the patient.     Concerns today   (Patient understands that medical problems addressed today may incur additional cost as this is a preventive visit)  Hasn't been here in over a year but still sees her endocrinologist regularly for her type 1 DM  Sees endo regularly but I don't have lab results, thinks last done in Dec  Sugars up and down  Her a1c got down to 7.4, in Dec back to 7.8  Does count her carbs, uses a pump, about 35 units insulin daily   Hard for her to avoid carbs, still has about 40 carbs per meal    Weight is down 6lb from last year  Still working with counselor for motivation, positive thinking, eating well    Constipated at times and metamucil and drinking more water help    Tried Trulicity but the nausea was a problem so stopped it    Blood pressure ok when checked  Mainly on losartan for her kidneys    Depression on cymbalta and wellbutrin doing ok  Also has neuropathy   Hard to say how much cymbalta is helping feet, they still burn/tingle at night  Endo doesn't think she has neuropathy because her sensation is so good  Tried gabapentin, didn't work well  When she took lyrica it helped her feet but made her dizzy/woozy    Says she could exercise but just doesn't do it    -asthma flared by cold air (exercise is another trigger), using Breo when affordable and when she has symptoms  -has had a bad attack 3-4 years ago  Notes a cat sleeps on her head  Right eye gets watery, itchy, wants eye drops  Gets runny nose as well      History     Patient Active Problem List   Diagnosis Code    Essential hypertension I10    Obesity (BMI 30-39. 9) E66.9    Type 1 diabetes mellitus with diabetic neuropathy (HCC) E10.40    Mild persistent asthma without complication W26.35    Pre-ulcerative calluses L84    Plantar fasciitis, right M72.2    Diabetic peripheral neuropathy associated with type 1 diabetes mellitus (HCC) E10.42    Recurrent major depressive disorder, in partial remission ([facility] [address] F33.41     Past Medical History:   Diagnosis Date    Asthma     Diabetes ([facility] [address]     Hypertension       Past Surgical History:   Procedure Laterality Date    HX CARPAL TUNNEL RELEASE      bilat    HX COLONOSCOPY  10/28/2010    approx date, normal per patient    HX ORTHOPAEDIC       Allergies   Allergen Reactions    Lyrica [Pregabalin] Vertigo     Current Outpatient Medications   Medication Sig Dispense Refill    ASPIRIN 81 MG/ 81 ML WATER, DESENSITIZATION, 1 tablet      Calcium Cmb #0-Q0-[id number] (CITRACAL D + [email or web address]) 315-250-200 mg-unit-mg tab 4 Every Day      calcium carb-vitamin D3-vit K2 600 mg-1,000 unit-90 mcg tab 1 tablet      DULoxetine (CYMBALTA) 60 mg capsule Take 1 Cap by mouth nightly. 90 Cap 3    buPROPion XL (WELLBUTRIN XL) 300 mg XL tablet Take 1 Tab by mouth every morning. 90 Tab 3    fluticasone furoate-vilanteroL (BREO ELLIPTA) 200-25 mcg/dose inhaler Take 1 Puff by inhalation daily. Uses prn for attacks 1 Inhaler 3    albuterol (PROVENTIL HFA, VENTOLIN HFA, PROAIR HFA) 90 mcg/actuation inhaler Take 2 Puffs by inhalation every four (4) hours as needed for Wheezing. 1 Inhaler 3    olopatadine (PATANOL) 0.1 % ophthalmic solution Administer 2 Drops to both eyes two (2) times daily as needed for Allergies. 5 mL 2    losartan (COZAAR) 25 mg tablet Take 1 Tab by mouth daily. 90 Tab 3    atorvastatin (LIPITOR) 40 mg tablet Take 1 Tab by mouth nightly.  90 Tab 3    insulin aspart (NOVOLOG) 100 unit/mL injection by SubCUTAneous route. pump      cholecalciferol (VITAMIN D3) 400 unit tab tablet Take 400 Units by mouth daily.  cyanocobalamin (VITAMIN B-12) 1,000 mcg tablet Take 1,000 mcg by mouth daily.  CALCIUM CITRATE/VITAMIN D3 (CITRACAL + D PO) Take  by mouth.  OTHER Bilateral diabetic orthotic shoes for callouses L84 and neuropathy with type 1 DM E10.42 RONNIE 99mo prog fair 2 Each 1     Family History   Problem Relation Age of Onset    Diabetes Mother     Heart Disease Mother     Heart Disease Father     Breast Cancer Sister     No Known Problems Sister     No Known Problems Sister     No Known Problems Sister     No Known Problems Sister     Cancer Sister         stomach ca     Social History     Tobacco Use    Smoking status: Never Smoker    Smokeless tobacco: Never Used   Substance Use Topics    Alcohol use: No       Specialists/Care Team   [place] HERO Franz has established care with the following healthcare providers:  Patient Care Team:  Parmjit Last MD as PCP - General (Family Practice)  Parmj[facility] Last [facility] P[facility] Empaneled Provider  Red Soares MD (Endocrinology)  Sayda Hunter MD (Ophthalmology)  Anthony Rodriguez MD (Obstetrics & Gynecology)  Has a counselor she sees regularly    [address]     Demographics   female  61 y.o. General Health Questions   -During the past 4 weeks:   -how would you rate your health in general? Good   -how often have you been bothered by feeling dizzy when standing up? never   -how much have you been bothered by bodily pain? Yes, in hands. Had carpal tunnel bilaterally, also arthritis, knuckles hurt. Uses tylenol or motrin at times. Used to be on lyrica for neuropathy pain but made her light-headed. Has learned to just live with the neuropathy    -Have you noticed any hearing difficulties? Yes, has hearing aids    -has your physical and emotional health limited your social activities with family or friends? no    Emotional Health Questions   -Do you have a history of depression, anxiety, or emotional problems? Yes  3 most recent PHQ Screens 5/25/2017   Little interest or pleasure in doing things Not at all   Feeling down, depressed, irritable, or hopeless Not at all   Total Score PHQ 2 0      Health Habits   Please describe your diet habits: tries to eat well, follows DM diet   Do you get 5 servings of fruits or vegetables daily? yes  Do you exercise regularly? Yes, walks dogs regularly, active     Activities of Daily Living and Functional Status   -Do you need help with eating, walking, dressing, bathing, toileting, the phone, transportation, shopping, preparing meals, housework, laundry, medications or managing money? no  -In the past four weeks, was someone available to help you if you needed and wanted help with anything? Yes, lives with sister   -Are you confident are you that you can control and manage most of your health problems? yes  -Have you been given information to help you keep track of your medications? yes  -How often do you have trouble taking your medications as prescribed? never    Fall Risk and Home Safety   Have you fallen 2 or more times in the past year? no  Does your home have rugs in the hallway, lack grab bars in the bathroom, lack handrails on the stairs or have poor lighting? Doesn't have grab bars but doesn't need them, only 3 steps to get in the house   Do you have smoke detectors and check them regularly? Not sure  Do you have difficulties driving a car? no  Do you always fasten your seat belt when you are in a car? yes    Review of Systems (if indicated for problems addressed today)   Card: denies chest pain  GI: denies hematochezia      Physical Examination     Vitals:    02/25/20 1520   BP: 135/76   Pulse: 70   Resp: 18   Temp: 98.2 °F (36.8 °C)   TempSrc: Oral   SpO2: 97%   Weight: 206 lb (93.4 kg)   Height: 5' 5\" (1.651 m)     Body mass index is 34.28 kg/m².    No exam data present  Was the patient's timed Up & Go test unsteady or longer than 30 seconds? no    Evaluation of Cognitive Function   Mood/affect:  happy  Orientation: normal  Appearance: age appropriate  Family member/caregiver input: none    Additional exam if indicated for problems addressed today:  General: stated age,obese, and in NAD  Eyes: PERRL, EOMI, no redness or drainage  Nose: no drainage  Mouth: no lesions  Throat: no erythema, exudate or swelling  Neck: supple, symmetrical, trachea midline, no adenopathy and thyroid: not enlarged, symmetric, no tenderness/mass/nodules  Lungs:  clear to auscultation w/o rales, rhonchi, wheezes w/normal effort and no use of accessory muscles of respiration   Heart: regular rate and rhythm, S1, S2 normal, no murmur, click, rub or gallop  Abdomen: soft, nontender, no masses  Ext:  No edema noted.   Normal sensation both feet to light touch  Lymph: no cervical adenopathy appreciated  Skin:  Normal. and no rash or abnormalities   Neuro: normal gait, CN 2-12 intact  Psych: alert and oriented to person, place, time and situation and Speech: appropriate quality, quantity and organization of sentences and mildly anxious affect    Advice/Referrals/Counseling (as indicated)   Education and counseling provided for any problems identified above: diet, exercise    Preventive Services     Health Maintenance   Topic Date Due    Colonoscopy  10/28/1978    Shingrix Vaccine Age 50> (1 of 2) 10/28/2010    Breast Cancer Screen Mammogram  02/01/2019    Eye Exam Retinal or Dilated  08/01/2019    Foot Exam Q1  12/13/2019    A1C test (Diabetic or Prediabetic)  12/13/2019    MICROALBUMIN Q1  12/13/2019    Lipid Screen  12/13/2019    PAP AKA CERVICAL CYTOLOGY  02/01/2021    Medicare Yearly Exam  02/25/2021    DTaP/Tdap/Td series (2 - Td) 01/08/2028    Hepatitis C Screening  Completed    Influenza Age 5 to Adult  Completed    Pneumococcal 0-64 years  Completed      (Preventive care checklist to be included in patient instructions)  Discussed today Done Previously Not Needed     X   Pneumococcal vaccines    x  Flu vaccine     x Hepatitis B vaccine (if at risk)   X rx sent to [facility]   Shingles vaccine    X 2018  TDAP vaccine    Per gyn last year  Mammogram    Per gyn 2/18  Pap smear   Due this year X age 48 normal cscope  Colorectal cancer screening     x Low-dose CT for lung cancer screening     x Bone density test    X yearly   Glaucoma screening    x  Cholesterol test    x  Diabetes screening test     x  Diabetes self-management class    x  Nutritionist referral for diabetes or renal disease     Discussion of Advance Directive   Discussed with Bruce Swain her ability to prepare and advance directive in the case that an injury or illness causes her to be unable to make health care decisions. Doesn't have one but willing to make. Sent home with [employer] choices folder last time, encouraged her again to do it    Assessment/Plan   Z00.00    ICD-10-CM ICD-9-CM    1. Medicare annual wellness visit, subsequent Z00.00 V70.0    2. Essential hypertension I10 401.9    3. Mild persistent asthma without complication D49.12 844.34    4. Obesity (BMI 30-39. 9) E66.9 278.00    5. Type 1 diabetes mellitus with diabetic neuropathy (HCC) E10.40 250.61  DIABETES FOOT EXAM     357.2    6. Recurrent major depressive disorder, in partial remission ([facility] [address] F33.41 296.35    7. Diabetic peripheral neuropathy associated with type 1 diabetes mellitus (HCC) E10.42 250.61      357.2    8. Pre-ulcerative calluses L84 700    9. Encounter for screening for HIV Z11.4 V73.89    10.  Chronic idiopathic constipation K59.04 564.00        Orders Placed This Encounter     DIABETES FOOT EXAM    ASPIRIN 81 MG/ 81 ML WATER, DESENSITIZATION,    Calcium Cmb #1-G2-[id number] (CITRACAL D + [facility]) 315-250-200 mg-unit-mg tab    calcium carb-vitamin D3-vit K2 600 mg-1,000 unit-90 mcg tab    DULoxetine (CYMBALTA) 60 mg capsule    buPROPion XL (WELLBUTRIN XL) 300 mg XL tablet    fluticasone furoate-vilanteroL (BREO ELLIPTA) 200-25 mcg/dose inhaler    albuterol (PROVENTIL HFA, VENTOLIN HFA, PROAIR HFA) 90 mcg/actuation inhaler    olopatadine (PATANOL) 0.1 % ophthalmic solution    losartan (COZAAR) 25 mg tablet    atorvastatin (LIPITOR) 40 mg tablet     Preventive as above  Would like to see her a bit more often, she is wiling to come back in 6 mo  Need her labs from endo, will request records  She feels a-motivated and also having a hard time losing weight; will up her wellbutrin  Has tried various meds for neuropathy without much help  Advised trying alpha lipoic acid 600mg daily  Asthma doing well, rarely bothers her  Other chronic problems stable    Follow-up and Dispositions    · Return in about 6 months (around 8/25/2020) for Follow up.          Bettina Carpenter MD

## 2020-02-25 NOTE — PROGRESS NOTES
Chief Complaint   Patient presents with    Diabetes     Patient is in the office today for 3 month follow up.  Cholesterol Problem    Hypertension     1. Have you been to the ER, urgent care clinic since your last visit? Hospitalized since your last visit? 2. Have you seen or consulted any other health care providers outside of the [address] [place] [address] since your last visit? Include any pap smears or colon screening.

## 2020-02-29 LAB
A1C %: 8
CHOLEST SERPL-MCNC: 154 MG/DL
HBA1C MFR BLD HPLC: 8 %
HDL CHOLESTEROL,HDL: 67 MG/DL
LDL-CHOLESTEROL: 73 MG/DL
MICROALB/CREAT RATIO, 140286: 6.4 MG/G
TRIGL SERPL-MCNC: 70 MG/DL

## 2020-03-02 ENCOUNTER — TELEPHONE (OUTPATIENT)
Dept: FAMILY MEDICINE CLINIC | Age: 60
End: 2020-03-02

## 2020-03-02 NOTE — TELEPHONE ENCOUNTER
----- Message from Ariana Pierre sent at 3/2/2020 12:48 PM EST -----  Regarding: Nani/telephone  Pt is requesting a referral to see Dr Shakila Dickinson on 3/520 at 3:45pm for DM. Pts number is [phone] and Dr Braden Posey number is [phone] at [facility] on [address].

## 2020-03-02 NOTE — TELEPHONE ENCOUNTER
----- Message from Yan Clemons sent at 2/29/2020  2:49 PM EST -----  Regarding: Dr. Mariel Anderson (if not patient):  Julianna Boyd      Relationship of caller (if not patient):  Self      Best contact number(s):  [phone]      Name of medication and dosage if known:  Novalog Insulin      Is patient out of this medication (yes/no):  Yes      Pharmacy name:  [address] [place] [address] listed in chart? (yes/no):  Yes  Pharmacy phone number:  Unknown      Details to clarify the request:  Pt states that she is out of refills for her medication and really needs its. Pt is requesting a refill for Novalog  Insulin to be sent to the [address] .     Thanks,  Yan Clemons

## 2020-03-02 NOTE — TELEPHONE ENCOUNTER
Disregard the referral request.  There is another TE already opened about that. I have already placed the referral and sent it to Apolinar Sanford to do .

## 2020-03-02 NOTE — TELEPHONE ENCOUNTER
I LVM asking pt to call back about the novolog. Per records looks like pt has insulin pump.   Asked pt to call me back

## 2020-08-28 ENCOUNTER — OFFICE VISIT (OUTPATIENT)
Dept: FAMILY MEDICINE CLINIC | Age: 60
End: 2020-08-28
Payer: MEDICARE

## 2020-08-28 VITALS
RESPIRATION RATE: 18 BRPM | SYSTOLIC BLOOD PRESSURE: 137 MMHG | OXYGEN SATURATION: 95 % | HEART RATE: 79 BPM | HEIGHT: 65 IN | WEIGHT: 208 LBS | BODY MASS INDEX: 34.66 KG/M2 | DIASTOLIC BLOOD PRESSURE: 65 MMHG

## 2020-08-28 DIAGNOSIS — E10.40 TYPE 1 DIABETES MELLITUS WITH DIABETIC NEUROPATHY (HCC): ICD-10-CM

## 2020-08-28 DIAGNOSIS — E10.42 DIABETIC PERIPHERAL NEUROPATHY ASSOCIATED WITH TYPE 1 DIABETES MELLITUS (HCC): ICD-10-CM

## 2020-08-28 DIAGNOSIS — J45.30 MILD PERSISTENT ASTHMA WITHOUT COMPLICATION: ICD-10-CM

## 2020-08-28 DIAGNOSIS — I10 ESSENTIAL HYPERTENSION: Primary | ICD-10-CM

## 2020-08-28 DIAGNOSIS — E66.9 OBESITY (BMI 30-39.9): ICD-10-CM

## 2020-08-28 DIAGNOSIS — F33.41 RECURRENT MAJOR DEPRESSIVE DISORDER, IN PARTIAL REMISSION (HCC): ICD-10-CM

## 2020-08-28 PROCEDURE — 99214 OFFICE O/P EST MOD 30 MIN: CPT | Performed by: FAMILY MEDICINE

## 2020-08-28 RX ORDER — ZOSTER VACCINE RECOMBINANT, ADJUVANTED 50 MCG/0.5
0.5 KIT INTRAMUSCULAR ONCE
Qty: 0.5 ML | Refills: 1 | Status: SHIPPED | OUTPATIENT
Start: 2020-08-28 | End: 2020-08-28

## 2020-08-28 NOTE — PROGRESS NOTES
Chief Complaint   Patient presents with    Complete Physical     Pt not fasting      1. Have you been to the ER, urgent care clinic since your last visit? Hospitalized since your last visit? No    2. Have you seen or consulted any other health care providers outside of the [address] [place] [address] since your last visit? Include any pap smears or colon screening.  No

## 2020-08-28 NOTE — PROGRESS NOTES
[facility]  [address] [place], [address]  [phone]             Date of visit: 8/28/20   Subjective:      History obtained from:  the patient. Nahomy Ramirez is a 61 y.o. female who presents today for   Chief Complaint   Patient presents with    Complete Physical     Pt not fasting      Hasn't been here in over a year but still sees her endocrinologist regularly for her type 1 DM and will go on 9/10. Will have labs on 9/4 and fasting  Last a1c was 7.4  Still on the pump    Tried Trulicity but nausea was limiting. She does want to lose weight.   Thinks overeater, habit of snacking    Goes to eye doctor regularly I just don't have record  So far so good    Has had constipation before but doing pretty well, every 1-2 days now    Blood pressure ok when checked but not checked at home  Mainly on losartan for her kidneys    Depression on cymbalta and wellbutrin doing ok  Also has neuropathy     Exercising \"as little as I can,\" could  Be more active  Does work in the yard, walks dog some, just not prolonged exercise    -asthma flared by cold air (exercise is another trigger), using Breo when affordable and when she has symptoms  Really needs it in the summer with the hot air  It works well but is expensive  -has had a bad attack 3-4 years ago      -will wait until next year for colonoscopy due to covid    Patient Active Problem List    Diagnosis Date Noted    Essential hypertension 05/25/2017    Obesity (BMI 30-39.9) 05/25/2017    Type 1 diabetes mellitus with diabetic neuropathy ([address] 05/25/2017    Mild persistent asthma without complication 14/01/9580    Pre-ulcerative calluses 05/25/2017    Plantar fasciitis, right 05/25/2017    Diabetic peripheral neuropathy associated with type 1 diabetes mellitus ([address] 05/25/2017    Recurrent major depressive disorder, in partial remission ([address] 05/25/2017     Current Outpatient Medications   Medication Sig Dispense Refill  ASPIRIN 81 MG/ 81 ML WATER, DESENSITIZATION, 1 tablet      Calcium Cmb #9-W2-Ishxamljted (CITRACAL D + HEART HEALTH) 315-250-200 mg-unit-mg tab 4 Every Day      calcium carb-vitamin D3-vit K2 600 mg-1,000 unit-90 mcg tab 1 tablet      DULoxetine (CYMBALTA) 60 mg capsule Take 1 Cap by mouth nightly. 90 Cap 3    buPROPion XL (WELLBUTRIN XL) 300 mg XL tablet Take 1 Tab by mouth every morning. 90 Tab 3    fluticasone furoate-vilanteroL (BREO ELLIPTA) 200-25 mcg/dose inhaler Take 1 Puff by inhalation daily. Uses prn for attacks 1 Inhaler 3    albuterol (PROVENTIL HFA, VENTOLIN HFA, PROAIR HFA) 90 mcg/actuation inhaler Take 2 Puffs by inhalation every four (4) hours as needed for Wheezing. 1 Inhaler 3    losartan (COZAAR) 25 mg tablet Take 1 Tab by mouth daily. 90 Tab 3    atorvastatin (LIPITOR) 40 mg tablet Take 1 Tab by mouth nightly. 90 Tab 3    insulin aspart (NOVOLOG) 100 unit/mL injection by SubCUTAneous route. pump      cholecalciferol (VITAMIN D3) 400 unit tab tablet Take 400 Units by mouth daily.  cyanocobalamin (VITAMIN B-12) 1,000 mcg tablet Take 1,000 mcg by mouth daily.  CALCIUM CITRATE/VITAMIN D3 (CITRACAL + D PO) Take  by mouth.  OTHER Bilateral diabetic orthotic shoes for callouses L84 and neuropathy with type 1 DM E10.42 RONNIE 99mo prog fair 2 Each 1    olopatadine (PATANOL) 0.1 % ophthalmic solution Administer 2 Drops to both eyes two (2) times daily as needed for Allergies.  5 mL 2     Allergies   Allergen Reactions    Lyrica [Pregabalin] Vertigo     Past Medical History:   Diagnosis Date    Asthma     Diabetes ([address]     Hypertension      Past Surgical History:   Procedure Laterality Date    HX CARPAL TUNNEL RELEASE      bilat    HX COLONOSCOPY  10/28/2010    approx date, normal per patient    HX ORTHOPAEDIC       Family History   Problem Relation Age of Onset    Diabetes Mother     Heart Disease Mother     Heart Disease Father     Breast Cancer Sister     Atrial Fibrillation Brother     No Known Problems Sister     No Known Problems Sister     No Known Problems Sister     No Known Problems Sister     Cancer Sister         stomach ca    Atrial Fibrillation Brother     Heart Attack Brother      Social History     Tobacco Use    Smoking status: Never Smoker    Smokeless tobacco: Never Used   Substance Use Topics    Alcohol use: No      Social History     Social History Narrative    Lives with one sister, has other sisters    Has 3 dogs         Review of Systems  Card: denies chest pain  Pulm: denies shortness of breath     Objective:     Vitals:    08/28/20 1359   BP: 137/65   Pulse: 79   Resp: 18   SpO2: 95%   Weight: 208 lb (94.3 kg)   Height: 5' 5\" (1.651 m)     Body mass index is 34.61 kg/m². General: stated age, well-developed, and in NAD  Eyes: PERRL, EOMI, no redness or drainage  Nose: no drainage  Mouth: no lesions  Throat: no erythema, exudate or swelling  Neck: supple, symmetrical, trachea midline, no adenopathy and thyroid: not enlarged, symmetric, no tenderness/mass/nodules  Lungs:  clear to auscultation w/o rales, rhonchi, wheezes w/normal effort and no use of accessory muscles of respiration   Heart: regular rate and rhythm, S1, S2 normal, no murmur, click, rub or gallop  Abdomen: soft, nontender, no masses  Ext:  No edema noted. Lymph: no cervical adenopathy appreciated  Skin:  Normal. and no rash or abnormalities   Neuro: normal gait, CN 2-12 intact  Psych: alert and oriented to person, place, time and situation and Speech: appropriate quality, quantity and organization of sentences    Assessment/Plan:       ICD-10-CM ICD-9-CM    1. Essential hypertension  I10 401.9    2. Type 1 diabetes mellitus with diabetic neuropathy (HCC)  E10.40 250.61      357.2    3. Obesity (BMI 30-39. 9)  E66.9 278.00    4. Mild persistent asthma without complication  U64.30 146.30    5.  Recurrent major depressive disorder, in partial remission ([facility]  F33.41 296.35 6. Diabetic peripheral neuropathy associated with type 1 diabetes mellitus ([facility]  E10.42 250.61      357.2         Orders Placed This Encounter    varicella-zoster recombinant, PF, (Shingrix, PF,) 50 mcg/0.5 mL susr injection     Chronic problems stable, no changes to meds  Encouraged continued healthy lifestyle, diet/exercise  Putting off some interventions until covid better    Discussed the diagnosis and plan and she expressed understanding. Follow-up and Dispositions    · Return in about 6 months (around 2/28/2021) for Follow up.      or if doing well ok to wait a year; sees carrillo regularly    Vicki Kelly MD

## 2020-09-02 ENCOUNTER — VIRTUAL VISIT (OUTPATIENT)
Dept: FAMILY MEDICINE CLINIC | Age: 60
End: 2020-09-02
Payer: MEDICARE

## 2020-09-02 DIAGNOSIS — L23.7 POISON IVY DERMATITIS: Primary | ICD-10-CM

## 2020-09-02 PROCEDURE — 99214 OFFICE O/P EST MOD 30 MIN: CPT | Performed by: NURSE PRACTITIONER

## 2020-09-02 RX ORDER — BETAMETHASONE DIPROPIONATE 0.5 MG/G
CREAM TOPICAL
Qty: 30 G | Refills: 0 | Status: SHIPPED | OUTPATIENT
Start: 2020-09-02

## 2020-09-02 NOTE — PROGRESS NOTES
[address] Note  Subjective:      John Roa is a 61 y.o. female who presents for an acute visit with the following chief complaints. Chief Complaint   Patient presents with    Rash     I was in the office while conducting this encounter. Consent:  She and/or her healthcare decision maker is aware that this patient-initiated Telehealth encounter is a billable service, with coverage as determined by her insurance carrier. She is aware that she may receive a bill and has provided verbal consent to proceed: Yes    This virtual visit was conducted via [employer]. Pursuant to the emergency declaration under the [facility], [facility] waiver authority and the [email or web address] and [employer] Act, this Virtual  Visit was conducted to reduce the patient's risk of exposure to COVID-19 and provide continuity of care for an established patient. Services were provided through a video synchronous discussion virtually to substitute for in-person clinic visit. Due to this being a TeleHealth evaluation, many elements of the physical examination are unable to be assessed. Total Time: minutes: 5-10 minutes. Worked in the yard 4-5 days ago. Onset of itchy rash 3 days ago. Rash at onset appears like little red bump, blisters. Located sporadically on on both hands, wrist, fingers, elbows, forearms. Ankles, lower calves. Left anterior neck itchy with little bumps. Discomfort associated with rash: pruritic. Associated symptoms: no associated symptoms. Denies: fever, cough, congestion, sore throat, headache, arthralgia, abdominal pain, nausea, vomiting, myalgia, crankiness, irritability, decrease in appetite, decrease in energy level. Patient has not had previous evaluation of rash. Treatments: calamine lotion with temproarty relief. Daily allergy pill helpful for itch at night.      Patient has not had contacts with similar rash. Patient has identified precipitant; yard work -suspects poison ivy. Patient has not had new exposures to soaps, lotions, laundry detergents, foods, medications, insects or animals. Current Outpatient Medications   Medication Sig Dispense Refill    betamethasone dipropionate (DIPROSONE) 0.05 % topical cream Apply to affected area twice daily PRN for up to 2 weeks. 30 g 0    ASPIRIN 81 MG/ 81 ML WATER, DESENSITIZATION, 1 tablet      Calcium Cmb #3-P3-Igkhmxwuezs (CITRACAL D + [facility]) 315-250-200 mg-unit-mg tab 4 Every Day      calcium carb-vitamin D3-vit K2 600 mg-1,000 unit-90 mcg tab 1 tablet      DULoxetine (CYMBALTA) 60 mg capsule Take 1 Cap by mouth nightly. 90 Cap 3    buPROPion XL (WELLBUTRIN XL) 300 mg XL tablet Take 1 Tab by mouth every morning. 90 Tab 3    fluticasone furoate-vilanteroL (BREO ELLIPTA) 200-25 mcg/dose inhaler Take 1 Puff by inhalation daily. Uses prn for attacks 1 Inhaler 3    albuterol (PROVENTIL HFA, VENTOLIN HFA, PROAIR HFA) 90 mcg/actuation inhaler Take 2 Puffs by inhalation every four (4) hours as needed for Wheezing. 1 Inhaler 3    olopatadine (PATANOL) 0.1 % ophthalmic solution Administer 2 Drops to both eyes two (2) times daily as needed for Allergies. 5 mL 2    losartan (COZAAR) 25 mg tablet Take 1 Tab by mouth daily. 90 Tab 3    atorvastatin (LIPITOR) 40 mg tablet Take 1 Tab by mouth nightly. 90 Tab 3    insulin aspart (NOVOLOG) 100 unit/mL injection by SubCUTAneous route. pump      cholecalciferol (VITAMIN D3) 400 unit tab tablet Take 400 Units by mouth daily.  cyanocobalamin (VITAMIN B-12) 1,000 mcg tablet Take 1,000 mcg by mouth daily.  CALCIUM CITRATE/VITAMIN D3 (CITRACAL + D PO) Take  by mouth.       OTHER Bilateral diabetic orthotic shoes for callouses L84 and neuropathy with type 1 DM E10.42 RONNIE 99mo prog fair 2 Each 1     Allergies   Allergen Reactions    Lyrica [Pregabalin] Vertigo       ROS:   Complete review of systems was reviewed with pertinent information listed in HPI. Review of Systems   Constitutional: Negative for chills, diaphoresis, fever, malaise/fatigue and weight loss. HENT: Negative for congestion, ear pain, hearing loss, sinus pain, sore throat and tinnitus. Eyes: Negative for blurred vision and double vision. Respiratory: Negative for shortness of breath. Cardiovascular: Negative for chest pain, palpitations and leg swelling. Gastrointestinal: Negative for abdominal pain, blood in stool, constipation, diarrhea, heartburn, melena, nausea and vomiting. Genitourinary: Negative for dysuria, frequency, hematuria and urgency. Musculoskeletal: Negative for joint pain and myalgias. Skin: Positive for itching and rash. Neurological: Negative for dizziness, tingling, weakness and headaches. Endo/Heme/Allergies: Negative for polydipsia. Psychiatric/Behavioral: Negative. Objective: There were no vitals taken for this visit. Vitals and Nurse Documentation reviewed. Physical Exam  Constitutional:       General: She is not in acute distress. Appearance: Normal appearance. She is well-developed, well-groomed and normal weight. She is not ill-appearing, toxic-appearing or diaphoretic. HENT:      Head: Normocephalic and atraumatic. Right Ear: Hearing normal.      Left Ear: Hearing normal.      Nose: No nasal deformity. Mouth/Throat:      Lips: Pink. No lesions. Mouth: Mucous membranes are moist.   Eyes:      General: Lids are normal.      Conjunctiva/sclera: Conjunctivae normal.   Pulmonary:      Effort: Pulmonary effort is normal.   Skin:         Neurological:      Mental Status: She is alert and oriented to person, place, and time. Gait: Gait is intact. Psychiatric:         Attention and Perception: Attention normal.         Mood and Affect: Mood normal.         Speech: Speech normal.         Behavior: Behavior normal. Behavior is cooperative.          Thought Content: Thought content normal.         Cognition and Memory: Cognition normal.         Judgment: Judgment normal.       Assessment/Plan:     Diagnoses and all orders for this visit:    1. Poison ivy dermatitis: Favor poison ivy dermatitis, mild. No signs of cellulitis. Start topical steroid therapy. Advised oral antihistamine for itch. Reviewed signs of cellulitis. RTC if symptoms worsen or do not resolve. -     betamethasone dipropionate (DIPROSONE) 0.05 % topical cream; Apply to affected area twice daily PRN for up to 2 weeks. Follow-up and Dispositions    · Return if symptoms worsen or fail to improve.        Discussed expected course/resolution/complications of diagnosis in detail with patient.    Medication risks/benefits/costs/interactions/alternatives discussed with patient.    Pt expressed understanding with the diagnosis and plan

## 2020-09-09 ENCOUNTER — TELEPHONE (OUTPATIENT)
Dept: FAMILY MEDICINE CLINIC | Age: 60
End: 2020-09-09

## 2020-09-09 DIAGNOSIS — E10.40 TYPE 1 DIABETES MELLITUS WITH DIABETIC NEUROPATHY (HCC): Primary | ICD-10-CM

## 2020-09-09 NOTE — TELEPHONE ENCOUNTER
Patient is requesting a referral to see Jose Blancllor on 09/10/20 @ 3:45 .  Once the the referral requisition is complete please forward to my attention so that I can retreive the insurance referral.      Thank Danny Waters

## 2020-10-16 ENCOUNTER — OFFICE VISIT (OUTPATIENT)
Dept: FAMILY MEDICINE CLINIC | Age: 60
End: 2020-10-16
Payer: MEDICARE

## 2020-10-16 VITALS
HEIGHT: 65 IN | DIASTOLIC BLOOD PRESSURE: 76 MMHG | SYSTOLIC BLOOD PRESSURE: 108 MMHG | TEMPERATURE: 97.5 F | RESPIRATION RATE: 18 BRPM | HEART RATE: 95 BPM | BODY MASS INDEX: 33.82 KG/M2 | WEIGHT: 203 LBS | OXYGEN SATURATION: 97 %

## 2020-10-16 DIAGNOSIS — Z23 ENCOUNTER FOR IMMUNIZATION: ICD-10-CM
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
## 2020-10-16 DIAGNOSIS — M79.671 RIGHT FOOT PAIN: Primary | ICD-10-CM
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
## 2020-10-16 PROCEDURE — 90686 IIV4 VACC NO PRSV 0.5 ML IM: CPT

## 2020-10-16 PROCEDURE — G8427 DOCREV CUR MEDS BY ELIG CLIN: HCPCS | Performed by: FAMILY MEDICINE

## 2020-10-16 PROCEDURE — G0008 ADMIN INFLUENZA VIRUS VAC: HCPCS | Performed by: FAMILY MEDICINE

## 2020-10-16 PROCEDURE — G8417 CALC BMI ABV UP PARAM F/U: HCPCS | Performed by: FAMILY MEDICINE

## 2020-10-16 PROCEDURE — G8754 DIAS BP LESS 90: HCPCS | Performed by: FAMILY MEDICINE

## 2020-10-16 PROCEDURE — G9717 DOC PT DX DEP/BP F/U NT REQ: HCPCS | Performed by: FAMILY MEDICINE

## 2020-10-16 PROCEDURE — G8752 SYS BP LESS 140: HCPCS | Performed by: FAMILY MEDICINE

## 2020-10-16 PROCEDURE — 99213 OFFICE O/P EST LOW 20 MIN: CPT | Performed by: FAMILY MEDICINE

## 2020-10-16 PROCEDURE — 3017F COLORECTAL CA SCREEN DOC REV: CPT

## 2020-10-16 NOTE — PROGRESS NOTES
[facility]mond[facility]  [place]. [place], [address]  [phone]             Date of visit: 10/16/20   Subjective:      History obtained from:  the patient. Melani Villarreal is a 61 y.o. female who presents today for   Chief Complaint   Patient presents with    Fall     Pt fell down the stairs on sunday and hurt her foot-  brusing has gone down but just wants to get it checked out      [place] Found down carpeted stairs  brused hips  Right foot got all bruised  The bruises got better  Never feels feet all that well due to neuropathy and wants to make sure she is not missing an injury    Fall was on 10/11/20    Iced foot and bruising disappeared      Weight 208 in Aug, now 0    Flu shot today    Patient Active Problem List    Diagnosis Date Noted    Essential hypertension 05/25/2017    Obesity (BMI 30-39.9) 05/25/2017    Type 1 diabetes mellitus with diabetic neuropathy ([facility] 05/25/2017    Mild persistent asthma without complication 97/28/6287    Pre-ulcerative calluses 05/25/2017    Plantar fasciitis, right 05/25/2017    Diabetic peripheral neuropathy associated with type 1 diabetes mellitus ([facility] 05/25/2017    Recurrent major depressive disorder, in partial remission ([facility] 05/25/2017     Current Outpatient Medications   Medication Sig Dispense Refill    betamethasone dipropionate (DIPROSONE) 0.05 % topical cream Apply to affected area twice daily PRN for up to 2 weeks. 30 g 0    ASPIRIN 81 MG/ 81 ML WATER, DESENSITIZATION, 1 tablet      Calcium Cmb #9-L1-Rxogzyozjvl (CITRACAL D + HEART HEALTH) 315-250-200 mg-unit-mg tab 4 Every Day      calcium carb-vitamin D3-vit K2 600 mg-1,000 unit-90 mcg tab 1 tablet      DULoxetine (CYMBALTA) 60 mg capsule Take 1 Cap by mouth nightly. 90 Cap 3    buPROPion XL (WELLBUTRIN XL) 300 mg XL tablet Take 1 Tab by mouth every morning.  90 Tab 3    fluticasone furoate-vilanteroL (BREO ELLIPTA) 200-25 mcg/dose inhaler Take 1 Puff by inhalation daily. Uses prn for attacks 1 Inhaler 3    albuterol (PROVENTIL HFA, VENTOLIN HFA, PROAIR HFA) 90 mcg/actuation inhaler Take 2 Puffs by inhalation every four (4) hours as needed for Wheezing. 1 Inhaler 3    olopatadine (PATANOL) 0.1 % ophthalmic solution Administer 2 Drops to both eyes two (2) times daily as needed for Allergies. 5 mL 2    losartan (COZAAR) 25 mg tablet Take 1 Tab by mouth daily. 90 Tab 3    atorvastatin (LIPITOR) 40 mg tablet Take 1 Tab by mouth nightly. 90 Tab 3    insulin aspart (NOVOLOG) 100 unit/mL injection by SubCUTAneous route. pump      cholecalciferol (VITAMIN D3) 400 unit tab tablet Take 400 Units by mouth daily.  cyanocobalamin (VITAMIN B-12) 1,000 mcg tablet Take 1,000 mcg by mouth daily.  CALCIUM CITRATE/VITAMIN D3 (CITRACAL + D PO) Take  by mouth.       OTHER Bilateral diabetic orthotic shoes for callouses L84 and neuropathy with type 1 DM E10.42 RONNIE 99mo prog fair 2 Each 1     Allergies   Allergen Reactions    Lyrica [Pregabalin] Vertigo     Past Medical History:   Diagnosis Date    Asthma     Diabetes ([address]     Hypertension      Past Surgical History:   Procedure Laterality Date    HX CARPAL TUNNEL RELEASE      bilat    HX COLONOSCOPY  10/28/2010    approx date, normal per patient    HX ORTHOPAEDIC       Family History   Problem Relation Age of Onset    Diabetes Mother     Heart Disease Mother     Heart Disease Father     Breast Cancer Sister     Atrial Fibrillation Brother     No Known Problems Sister     No Known Problems Sister     No Known Problems Sister     No Known Problems Sister     Cancer Sister         stomach ca    Atrial Fibrillation Brother     Heart Attack Brother      Social History     Tobacco Use    Smoking status: Never Smoker    Smokeless tobacco: Never Used   Substance Use Topics    Alcohol use: No      Social History     Social History Narrative    Lives with one sister, has other sisters    Has 3 dogs Review of Systems  Gen: denies fever  pulm denies cough      Objective:     Vitals:    10/16/20 1537   BP: 108/76   Pulse: 95   Resp: 18   Temp: 97.5 °F (36.4 °C)   TempSrc: Temporal   SpO2: 97%   Weight: 203 lb (92.1 kg)   Height: 5' 5\" (1.651 m)     Body mass index is 33.78 kg/m². General: stated age, well developed, well nourished and in NAD  MSK: no tenderness of bones in right ankle, foot. Right ankle slighlty diffusely swollen. No pain on stability testing and no instability. Little ecchymosis on dorsal mid-foot. Skin:  No lesions noted other than bruising and a very superficial scrape to right dorsal foot  Psych: alert and oriented to person, place, time and situation and Speech: appropriate quality, quantity and organization of sentences     Assessment/Plan:       ICD-10-CM ICD-9-CM    1. Right foot pain  M79.671 729.5    2. Fall in home, initial encounter  W19. XXXA E888.9     Y92.009 E849.0    3. Encounter for immunization  Z23 V03.89 INFLUENZA VIRUS VAC QUAD,SPLIT,PRESV FREE SYRINGE IM      ID IMMUNIZ ADMIN,1 SINGLE/COMB VAC/TOXOID        Orders Placed This Encounter    ID IMMUNIZ ADMIN,1 SINGLE/COMB VAC/TOXOID    INFLUENZA VIRUS VACCINE QUADRIVALENT, PRESERVATIVE FREE SYRINGE ([id number])       Reassured her foot looks ok  Can't feel much but no signs of serious injury on my exam, was walking well, did not recommend an xray  Explained bruising will slowly resolve  Discussed fall prevention    Discussed the diagnosis and plan and she expressed understanding. F/u as planned 2/2/21  Follow-up and Dispositions    · Return in about 4 months (around 2/16/2021) for Follow up, Annual Wellness Visit.          Denney Sandhoff, MD

## 2020-10-16 NOTE — PATIENT INSTRUCTIONS

## 2020-10-16 NOTE — PROGRESS NOTES
Chief Complaint   Patient presents with    Fall     Pt fell down the stairs on sunday and hurt her foot-  brusing has gone down but just wants to get it checked out      1. Have you been to the ER, urgent care clinic since your last visit? Hospitalized since your last visit? No    2. Have you seen or consulted any other health care providers outside of the [address] [place] [address] since your last visit? Include any pap smears or colon screening.  No

## 2020-11-06 ENCOUNTER — PATIENT MESSAGE (OUTPATIENT)
Dept: FAMILY MEDICINE CLINIC | Age: 60
End: 2020-11-06

## 2020-12-06 ENCOUNTER — TELEPHONE (OUTPATIENT)
Dept: FAMILY MEDICINE CLINIC | Age: 60
End: 2020-12-06

## 2020-12-06 NOTE — TELEPHONE ENCOUNTER
Chief Complaint   Patient presents with    Mammogram Reminder     Mammogram Reminder/update     Called, left vm for pt to return call to office.

## 2021-01-04 DIAGNOSIS — E10.40 TYPE 1 DIABETES MELLITUS WITH DIABETIC NEUROPATHY (HCC): Primary | ICD-10-CM

## 2021-07-26 NOTE — TELEPHONE ENCOUNTER
Additional testing need ordered per provider note. Fax from pharmacy that [address] not covered. Insurance prefers either advair or breo.

## 2022-03-18 PROBLEM — J45.30 MILD PERSISTENT ASTHMA WITHOUT COMPLICATION: Status: ACTIVE | Noted: 2017-05-25

## 2022-03-18 PROBLEM — F33.41 RECURRENT MAJOR DEPRESSIVE DISORDER, IN PARTIAL REMISSION (HCC): Status: ACTIVE | Noted: 2017-05-25

## 2022-03-19 PROBLEM — E10.40 TYPE 1 DIABETES MELLITUS WITH DIABETIC NEUROPATHY (HCC): Status: ACTIVE | Noted: 2017-05-25

## 2022-03-19 PROBLEM — E66.9 OBESITY (BMI 30-39.9): Status: ACTIVE | Noted: 2017-05-25

## 2022-03-19 PROBLEM — L84 PRE-ULCERATIVE CALLUSES: Status: ACTIVE | Noted: 2017-05-25

## 2022-03-19 PROBLEM — I10 ESSENTIAL HYPERTENSION: Status: ACTIVE | Noted: 2017-05-25

## 2022-03-20 PROBLEM — M72.2 PLANTAR FASCIITIS, RIGHT: Status: ACTIVE | Noted: 2017-05-25

## 2022-03-20 PROBLEM — E10.42 DIABETIC PERIPHERAL NEUROPATHY ASSOCIATED WITH TYPE 1 DIABETES MELLITUS (HCC): Status: ACTIVE | Noted: 2017-05-25

## 2023-05-11 RX ORDER — LOSARTAN POTASSIUM 25 MG/1
TABLET ORAL DAILY
COMMUNITY
Start: 2020-02-25

## 2023-05-11 RX ORDER — OLOPATADINE HYDROCHLORIDE 1 MG/ML
2 SOLUTION/ DROPS OPHTHALMIC 2 TIMES DAILY PRN
COMMUNITY
Start: 2020-02-25

## 2023-05-11 RX ORDER — INSULIN ASPART 100 [IU]/ML
INJECTION, SOLUTION INTRAVENOUS; SUBCUTANEOUS
COMMUNITY

## 2023-05-11 RX ORDER — ALBUTEROL SULFATE 90 UG/1
2 AEROSOL, METERED RESPIRATORY (INHALATION) EVERY 4 HOURS PRN
COMMUNITY
Start: 2020-02-25

## 2023-05-11 RX ORDER — BUPROPION HYDROCHLORIDE 300 MG/1
TABLET ORAL
COMMUNITY
Start: 2020-02-25

## 2023-05-11 RX ORDER — BETAMETHASONE DIPROPIONATE 0.5 MG/G
CREAM TOPICAL
COMMUNITY
Start: 2020-09-02

## 2023-05-11 RX ORDER — OMEGA-3S/DHA/EPA/FISH OIL/D3 300MG-1000
400 CAPSULE ORAL DAILY
COMMUNITY

## 2023-05-11 RX ORDER — FLUTICASONE FUROATE AND VILANTEROL 200; 25 UG/1; UG/1
1 POWDER RESPIRATORY (INHALATION) DAILY
COMMUNITY
Start: 2020-02-25

## 2023-05-11 RX ORDER — ATORVASTATIN CALCIUM 40 MG/1
TABLET, FILM COATED ORAL
COMMUNITY
Start: 2020-02-25

## 2023-05-11 RX ORDER — DULOXETIN HYDROCHLORIDE 60 MG/1
CAPSULE, DELAYED RELEASE ORAL
COMMUNITY
Start: 2020-02-25